# Patient Record
Sex: FEMALE | Race: BLACK OR AFRICAN AMERICAN | Employment: UNEMPLOYED | URBAN - METROPOLITAN AREA
[De-identification: names, ages, dates, MRNs, and addresses within clinical notes are randomized per-mention and may not be internally consistent; named-entity substitution may affect disease eponyms.]

---

## 2018-01-03 RX ORDER — PRAMIPEXOLE DIHYDROCHLORIDE 0.12 MG/1
0.12 TABLET ORAL
COMMUNITY

## 2018-01-03 RX ORDER — DEXTROAMPHETAMINE SULFATE 10 MG/1
20 TABLET ORAL 2 TIMES DAILY
COMMUNITY

## 2018-01-03 RX ORDER — ALBUTEROL SULFATE 90 UG/1
AEROSOL, METERED RESPIRATORY (INHALATION)
COMMUNITY

## 2018-01-03 RX ORDER — CLONAZEPAM 1 MG/1
1 TABLET ORAL 2 TIMES DAILY
COMMUNITY

## 2018-01-03 RX ORDER — FLUTICASONE PROPIONATE 110 UG/1
2 AEROSOL, METERED RESPIRATORY (INHALATION) EVERY 12 HOURS
COMMUNITY

## 2018-01-03 RX ORDER — DIMETHYL FUMARATE 240 MG/1
CAPSULE ORAL 2 TIMES DAILY
COMMUNITY

## 2018-01-03 RX ORDER — SENNOSIDES 8.6 MG/1
2 TABLET ORAL DAILY
COMMUNITY

## 2018-01-03 RX ORDER — POLYETHYLENE GLYCOL 3350 17 G/17G
17 POWDER, FOR SOLUTION ORAL 2 TIMES DAILY
COMMUNITY

## 2018-01-03 RX ORDER — BUPROPION HYDROCHLORIDE 150 MG/1
150 TABLET, EXTENDED RELEASE ORAL 2 TIMES DAILY
COMMUNITY

## 2018-01-03 RX ORDER — ESZOPICLONE 3 MG/1
3 TABLET, FILM COATED ORAL
COMMUNITY

## 2018-01-03 RX ORDER — ATORVASTATIN CALCIUM 80 MG/1
80 TABLET, FILM COATED ORAL
COMMUNITY

## 2018-01-03 RX ORDER — DULOXETIN HYDROCHLORIDE 60 MG/1
60 CAPSULE, DELAYED RELEASE ORAL 2 TIMES DAILY
COMMUNITY

## 2018-01-03 RX ORDER — LANSOPRAZOLE 30 MG/1
60 CAPSULE, DELAYED RELEASE ORAL
COMMUNITY

## 2018-01-03 RX ORDER — METOPROLOL SUCCINATE 25 MG/1
25 TABLET, EXTENDED RELEASE ORAL DAILY
COMMUNITY

## 2018-01-03 RX ORDER — GABAPENTIN 300 MG/1
600 CAPSULE ORAL 3 TIMES DAILY
COMMUNITY

## 2018-01-03 RX ORDER — METFORMIN HYDROCHLORIDE 500 MG/1
500 TABLET ORAL 2 TIMES DAILY WITH MEALS
COMMUNITY

## 2018-01-03 RX ORDER — ARIPIPRAZOLE 30 MG/1
30 TABLET ORAL DAILY
COMMUNITY

## 2018-01-03 RX ORDER — TRAMADOL HYDROCHLORIDE 50 MG/1
100 TABLET ORAL
COMMUNITY

## 2018-01-03 RX ORDER — LYSINE HCL 500 MG
TABLET ORAL 2 TIMES DAILY
COMMUNITY

## 2018-01-03 RX ORDER — LISINOPRIL 10 MG/1
10 TABLET ORAL DAILY
COMMUNITY

## 2018-01-03 RX ORDER — NIFEDIPINE 30 MG/1
TABLET, EXTENDED RELEASE ORAL DAILY
COMMUNITY

## 2018-01-04 ENCOUNTER — ANESTHESIA EVENT (OUTPATIENT)
Dept: SURGERY | Age: 53
DRG: 493 | End: 2018-01-04
Payer: MEDICARE

## 2018-01-05 ENCOUNTER — HOSPITAL ENCOUNTER (INPATIENT)
Age: 53
LOS: 2 days | Discharge: HOME | DRG: 493 | End: 2018-01-07
Attending: ORTHOPAEDIC SURGERY | Admitting: ORTHOPAEDIC SURGERY
Payer: MEDICARE

## 2018-01-05 ENCOUNTER — APPOINTMENT (OUTPATIENT)
Dept: GENERAL RADIOLOGY | Age: 53
DRG: 493 | End: 2018-01-05
Attending: ORTHOPAEDIC SURGERY
Payer: MEDICARE

## 2018-01-05 ENCOUNTER — ANESTHESIA (OUTPATIENT)
Dept: SURGERY | Age: 53
DRG: 493 | End: 2018-01-05
Payer: MEDICARE

## 2018-01-05 DIAGNOSIS — S42.351G CLOSED DISPLACED COMMINUTED FRACTURE OF SHAFT OF RIGHT HUMERUS WITH DELAYED HEALING, SUBSEQUENT ENCOUNTER: Primary | ICD-10-CM

## 2018-01-05 PROBLEM — S42.301G: Status: ACTIVE | Noted: 2018-01-05

## 2018-01-05 LAB
GLUCOSE BLD STRIP.AUTO-MCNC: 123 MG/DL (ref 70–110)
GLUCOSE BLD STRIP.AUTO-MCNC: 74 MG/DL (ref 70–110)

## 2018-01-05 PROCEDURE — C1713 ANCHOR/SCREW BN/BN,TIS/BN: HCPCS | Performed by: ORTHOPAEDIC SURGERY

## 2018-01-05 PROCEDURE — 0PUG0KZ SUPPLEMENT LEFT HUMERAL SHAFT WITH NONAUTOLOGOUS TISSUE SUBSTITUTE, OPEN APPROACH: ICD-10-PCS | Performed by: ORTHOPAEDIC SURGERY

## 2018-01-05 PROCEDURE — 77030020753 HC CUF TRNQT 1BLA STRY -B: Performed by: ORTHOPAEDIC SURGERY

## 2018-01-05 PROCEDURE — 0PPF04Z REMOVAL OF INTERNAL FIXATION DEVICE FROM RIGHT HUMERAL SHAFT, OPEN APPROACH: ICD-10-PCS | Performed by: ORTHOPAEDIC SURGERY

## 2018-01-05 PROCEDURE — 74011250637 HC RX REV CODE- 250/637: Performed by: NURSE ANESTHETIST, CERTIFIED REGISTERED

## 2018-01-05 PROCEDURE — 74011250637 HC RX REV CODE- 250/637: Performed by: ORTHOPAEDIC SURGERY

## 2018-01-05 PROCEDURE — 77030017009 HC DRN WNDE BARD -E: Performed by: ORTHOPAEDIC SURGERY

## 2018-01-05 PROCEDURE — 77030016470 HC BIT DRL QC1 SYNT -C: Performed by: ORTHOPAEDIC SURGERY

## 2018-01-05 PROCEDURE — 0PSG04Z REPOSITION LEFT HUMERAL SHAFT WITH INTERNAL FIXATION DEVICE, OPEN APPROACH: ICD-10-PCS | Performed by: ORTHOPAEDIC SURGERY

## 2018-01-05 PROCEDURE — 65270000029 HC RM PRIVATE

## 2018-01-05 PROCEDURE — 77030011265 HC ELECTRD BLD HEX COVD -A: Performed by: ORTHOPAEDIC SURGERY

## 2018-01-05 PROCEDURE — 77030008462 HC STPLR SKN PROX J&J -A: Performed by: ORTHOPAEDIC SURGERY

## 2018-01-05 PROCEDURE — 74011250636 HC RX REV CODE- 250/636: Performed by: NURSE ANESTHETIST, CERTIFIED REGISTERED

## 2018-01-05 PROCEDURE — 76210000017 HC OR PH I REC 1.5 TO 2 HR: Performed by: ORTHOPAEDIC SURGERY

## 2018-01-05 PROCEDURE — 77030002933 HC SUT MCRYL J&J -A: Performed by: ORTHOPAEDIC SURGERY

## 2018-01-05 PROCEDURE — 64450 NJX AA&/STRD OTHER PN/BRANCH: CPT | Performed by: ANESTHESIOLOGY

## 2018-01-05 PROCEDURE — 82962 GLUCOSE BLOOD TEST: CPT

## 2018-01-05 PROCEDURE — 77030013079 HC BLNKT BAIR HGGR 3M -A: Performed by: NURSE ANESTHETIST, CERTIFIED REGISTERED

## 2018-01-05 PROCEDURE — 77030012904 HC TAPE CST BSNM -A: Performed by: ORTHOPAEDIC SURGERY

## 2018-01-05 PROCEDURE — 77030003862 HC BIT DRL SYNT -B: Performed by: ORTHOPAEDIC SURGERY

## 2018-01-05 PROCEDURE — 74011250636 HC RX REV CODE- 250/636: Performed by: ORTHOPAEDIC SURGERY

## 2018-01-05 PROCEDURE — 74011250636 HC RX REV CODE- 250/636

## 2018-01-05 PROCEDURE — 77030016665 HC BUR RND2 STRY -B: Performed by: ORTHOPAEDIC SURGERY

## 2018-01-05 PROCEDURE — 01X60Z6 TRANSFER RADIAL NERVE TO RADIAL NERVE, OPEN APPROACH: ICD-10-PCS | Performed by: ORTHOPAEDIC SURGERY

## 2018-01-05 PROCEDURE — 77030034094 HC GRFT BN SUB ELITE TRNTY MUSC -I1: Performed by: ORTHOPAEDIC SURGERY

## 2018-01-05 PROCEDURE — 77030028990 HC ADH TISS DERMFLX CHMP -B: Performed by: ORTHOPAEDIC SURGERY

## 2018-01-05 PROCEDURE — 77030012422 HC DRN WND COVD -A: Performed by: ORTHOPAEDIC SURGERY

## 2018-01-05 PROCEDURE — 77030003028 HC SUT VCRL J&J -A: Performed by: ORTHOPAEDIC SURGERY

## 2018-01-05 PROCEDURE — 77030003029 HC SUT VCRL J&J -B: Performed by: ORTHOPAEDIC SURGERY

## 2018-01-05 PROCEDURE — 76010000133 HC OR TIME 3 TO 3.5 HR: Performed by: ORTHOPAEDIC SURGERY

## 2018-01-05 PROCEDURE — 77030018836 HC SOL IRR NACL ICUM -A: Performed by: ORTHOPAEDIC SURGERY

## 2018-01-05 PROCEDURE — 76060000037 HC ANESTHESIA 3 TO 3.5 HR: Performed by: ORTHOPAEDIC SURGERY

## 2018-01-05 PROCEDURE — 77030012407 HC DRN WND BARD -B: Performed by: ORTHOPAEDIC SURGERY

## 2018-01-05 PROCEDURE — 77030020335 HC PDNG CST COVD -A: Performed by: ORTHOPAEDIC SURGERY

## 2018-01-05 PROCEDURE — 77030031139 HC SUT VCRL2 J&J -A: Performed by: ORTHOPAEDIC SURGERY

## 2018-01-05 PROCEDURE — 77030012510 HC MSK AIRWY LMA TELE -B: Performed by: NURSE ANESTHETIST, CERTIFIED REGISTERED

## 2018-01-05 PROCEDURE — 77030008974 HC BN CANC CHP LIFV -D: Performed by: ORTHOPAEDIC SURGERY

## 2018-01-05 PROCEDURE — 74011250636 HC RX REV CODE- 250/636: Performed by: ANESTHESIOLOGY

## 2018-01-05 PROCEDURE — 76942 ECHO GUIDE FOR BIOPSY: CPT | Performed by: ANESTHESIOLOGY

## 2018-01-05 PROCEDURE — 77030032490 HC SLV COMPR SCD KNE COVD -B: Performed by: ORTHOPAEDIC SURGERY

## 2018-01-05 PROCEDURE — 74011000250 HC RX REV CODE- 250

## 2018-01-05 DEVICE — GRAFT BNE SUB M CANC FRZN MORSELIZED W/ VIABLE CELL TRINITY: Type: IMPLANTABLE DEVICE | Site: ARM | Status: FUNCTIONAL

## 2018-01-05 DEVICE — SCREW BNE L28MM DIA3.5MM CORT S STL ST LOK FULL THRD: Type: IMPLANTABLE DEVICE | Site: ARM | Status: FUNCTIONAL

## 2018-01-05 DEVICE — 3.5MM CORTEX SCREW SELF-TAPPING 24MM: Type: IMPLANTABLE DEVICE | Site: ARM | Status: FUNCTIONAL

## 2018-01-05 DEVICE — SCREW BNE L26MM DIA3.5MM CORT S STL ST LOK FULL THRD: Type: IMPLANTABLE DEVICE | Site: ARM | Status: FUNCTIONAL

## 2018-01-05 DEVICE — SCREW BNE L36MM DIA4.5MM PROX CORT TIB S STL ST LOK FULL: Type: IMPLANTABLE DEVICE | Site: ARM | Status: FUNCTIONAL

## 2018-01-05 DEVICE — SCREW BNE L26MM DIA3.5MM CORT S STL ST NONCANNULATED LOK: Type: IMPLANTABLE DEVICE | Site: ARM | Status: FUNCTIONAL

## 2018-01-05 DEVICE — PLATE BNE W13.5XL134MM THK4.2MM 7 H BILAT S STL NAR LOK: Type: IMPLANTABLE DEVICE | Site: ARM | Status: FUNCTIONAL

## 2018-01-05 DEVICE — SCREW BNE L18MM DIA3.5MM CORT S STL ST LOK FULL THRD: Type: IMPLANTABLE DEVICE | Site: ARM | Status: FUNCTIONAL

## 2018-01-05 DEVICE — PLATE BNE L194MM 8 H L DST EXTRAARTICULAR HUM S STL LOK: Type: IMPLANTABLE DEVICE | Site: ARM | Status: FUNCTIONAL

## 2018-01-05 DEVICE — SCREW BNE L28MM DIA5MM S STL ST LOK FULL THRD T25 STARDRV: Type: IMPLANTABLE DEVICE | Site: ARM | Status: FUNCTIONAL

## 2018-01-05 DEVICE — BONE CHIP CANC CRSH 1-8MM 20ML -- PCAN1/4: Type: IMPLANTABLE DEVICE | Site: ARM | Status: FUNCTIONAL

## 2018-01-05 DEVICE — SCREW BNE L26MM DIA4.5MM PROX CORT TIB S STL ST LOK FULL: Type: IMPLANTABLE DEVICE | Site: ARM | Status: FUNCTIONAL

## 2018-01-05 DEVICE — SCREW BNE L26MM DIA5MM S STL ST LOK FULL THRD T25 STARDRV: Type: IMPLANTABLE DEVICE | Site: ARM | Status: FUNCTIONAL

## 2018-01-05 RX ORDER — MIDAZOLAM HYDROCHLORIDE 1 MG/ML
2 INJECTION, SOLUTION INTRAMUSCULAR; INTRAVENOUS ONCE
Status: COMPLETED | OUTPATIENT
Start: 2018-01-05 | End: 2018-01-05

## 2018-01-05 RX ORDER — CEFAZOLIN SODIUM 2 G/50ML
2 SOLUTION INTRAVENOUS EVERY 8 HOURS
Status: DISCONTINUED | OUTPATIENT
Start: 2018-01-05 | End: 2018-01-07 | Stop reason: HOSPADM

## 2018-01-05 RX ORDER — SODIUM CHLORIDE 0.9 % (FLUSH) 0.9 %
5-10 SYRINGE (ML) INJECTION AS NEEDED
Status: DISCONTINUED | OUTPATIENT
Start: 2018-01-05 | End: 2018-01-07 | Stop reason: HOSPADM

## 2018-01-05 RX ORDER — SODIUM CHLORIDE 9 MG/ML
INJECTION, SOLUTION INTRAVENOUS
Status: DISPENSED
Start: 2018-01-05 | End: 2018-01-06

## 2018-01-05 RX ORDER — MAGNESIUM SULFATE 100 %
4 CRYSTALS MISCELLANEOUS AS NEEDED
Status: DISCONTINUED | OUTPATIENT
Start: 2018-01-05 | End: 2018-01-05 | Stop reason: HOSPADM

## 2018-01-05 RX ORDER — HYDROMORPHONE HYDROCHLORIDE 2 MG/ML
0.5 INJECTION, SOLUTION INTRAMUSCULAR; INTRAVENOUS; SUBCUTANEOUS
Status: DISCONTINUED | OUTPATIENT
Start: 2018-01-05 | End: 2018-01-05 | Stop reason: HOSPADM

## 2018-01-05 RX ORDER — SODIUM CHLORIDE, SODIUM LACTATE, POTASSIUM CHLORIDE, CALCIUM CHLORIDE 600; 310; 30; 20 MG/100ML; MG/100ML; MG/100ML; MG/100ML
75 INJECTION, SOLUTION INTRAVENOUS CONTINUOUS
Status: DISCONTINUED | OUTPATIENT
Start: 2018-01-05 | End: 2018-01-05 | Stop reason: HOSPADM

## 2018-01-05 RX ORDER — FENTANYL CITRATE 50 UG/ML
100 INJECTION, SOLUTION INTRAMUSCULAR; INTRAVENOUS ONCE
Status: COMPLETED | OUTPATIENT
Start: 2018-01-05 | End: 2018-01-05

## 2018-01-05 RX ORDER — ACETAMINOPHEN 10 MG/ML
1000 INJECTION, SOLUTION INTRAVENOUS ONCE
Status: COMPLETED | OUTPATIENT
Start: 2018-01-05 | End: 2018-01-05

## 2018-01-05 RX ORDER — HYDROMORPHONE HYDROCHLORIDE 2 MG/ML
0.2 INJECTION, SOLUTION INTRAMUSCULAR; INTRAVENOUS; SUBCUTANEOUS AS NEEDED
Status: DISCONTINUED | OUTPATIENT
Start: 2018-01-05 | End: 2018-01-05 | Stop reason: HOSPADM

## 2018-01-05 RX ORDER — SODIUM CHLORIDE 0.9 % (FLUSH) 0.9 %
5-10 SYRINGE (ML) INJECTION EVERY 8 HOURS
Status: DISCONTINUED | OUTPATIENT
Start: 2018-01-05 | End: 2018-01-07 | Stop reason: HOSPADM

## 2018-01-05 RX ORDER — INSULIN LISPRO 100 [IU]/ML
INJECTION, SOLUTION INTRAVENOUS; SUBCUTANEOUS ONCE
Status: DISCONTINUED | OUTPATIENT
Start: 2018-01-05 | End: 2018-01-05 | Stop reason: HOSPADM

## 2018-01-05 RX ORDER — SODIUM CHLORIDE 0.9 % (FLUSH) 0.9 %
5-10 SYRINGE (ML) INJECTION AS NEEDED
Status: DISCONTINUED | OUTPATIENT
Start: 2018-01-05 | End: 2018-01-05 | Stop reason: HOSPADM

## 2018-01-05 RX ORDER — SODIUM CHLORIDE, SODIUM LACTATE, POTASSIUM CHLORIDE, CALCIUM CHLORIDE 600; 310; 30; 20 MG/100ML; MG/100ML; MG/100ML; MG/100ML
100 INJECTION, SOLUTION INTRAVENOUS CONTINUOUS
Status: DISCONTINUED | OUTPATIENT
Start: 2018-01-05 | End: 2018-01-05 | Stop reason: HOSPADM

## 2018-01-05 RX ORDER — BUPIVACAINE HYDROCHLORIDE AND EPINEPHRINE 2.5; 5 MG/ML; UG/ML
50 INJECTION, SOLUTION EPIDURAL; INFILTRATION; INTRACAUDAL; PERINEURAL ONCE
Status: DISCONTINUED | OUTPATIENT
Start: 2018-01-05 | End: 2018-01-05 | Stop reason: CLARIF

## 2018-01-05 RX ORDER — FENTANYL CITRATE 50 UG/ML
INJECTION, SOLUTION INTRAMUSCULAR; INTRAVENOUS AS NEEDED
Status: DISCONTINUED | OUTPATIENT
Start: 2018-01-05 | End: 2018-01-05 | Stop reason: HOSPADM

## 2018-01-05 RX ORDER — DOCUSATE SODIUM 100 MG/1
100 CAPSULE, LIQUID FILLED ORAL 2 TIMES DAILY
Status: DISCONTINUED | OUTPATIENT
Start: 2018-01-05 | End: 2018-01-07 | Stop reason: HOSPADM

## 2018-01-05 RX ORDER — OXYCODONE AND ACETAMINOPHEN 5; 325 MG/1; MG/1
1-2 TABLET ORAL
Qty: 50 TAB | Refills: 0 | Status: SHIPPED | OUTPATIENT
Start: 2018-01-05

## 2018-01-05 RX ORDER — ONDANSETRON 4 MG/1
4 TABLET, ORALLY DISINTEGRATING ORAL
Status: DISCONTINUED | OUTPATIENT
Start: 2018-01-05 | End: 2018-01-07 | Stop reason: HOSPADM

## 2018-01-05 RX ORDER — CEFAZOLIN SODIUM 2 G/50ML
2 SOLUTION INTRAVENOUS ONCE
Status: COMPLETED | OUTPATIENT
Start: 2018-01-05 | End: 2018-01-05

## 2018-01-05 RX ORDER — PROPOFOL 10 MG/ML
INJECTION, EMULSION INTRAVENOUS AS NEEDED
Status: DISCONTINUED | OUTPATIENT
Start: 2018-01-05 | End: 2018-01-05 | Stop reason: HOSPADM

## 2018-01-05 RX ORDER — DEXTROSE 50 % IN WATER (D50W) INTRAVENOUS SYRINGE
25-50 AS NEEDED
Status: DISCONTINUED | OUTPATIENT
Start: 2018-01-05 | End: 2018-01-05 | Stop reason: HOSPADM

## 2018-01-05 RX ORDER — LIDOCAINE HYDROCHLORIDE 10 MG/ML
0.1 INJECTION, SOLUTION EPIDURAL; INFILTRATION; INTRACAUDAL; PERINEURAL AS NEEDED
Status: DISCONTINUED | OUTPATIENT
Start: 2018-01-05 | End: 2018-01-05 | Stop reason: HOSPADM

## 2018-01-05 RX ORDER — MIDAZOLAM HYDROCHLORIDE 1 MG/ML
INJECTION, SOLUTION INTRAMUSCULAR; INTRAVENOUS AS NEEDED
Status: DISCONTINUED | OUTPATIENT
Start: 2018-01-05 | End: 2018-01-05 | Stop reason: HOSPADM

## 2018-01-05 RX ORDER — LIDOCAINE HYDROCHLORIDE 20 MG/ML
INJECTION, SOLUTION EPIDURAL; INFILTRATION; INTRACAUDAL; PERINEURAL AS NEEDED
Status: DISCONTINUED | OUTPATIENT
Start: 2018-01-05 | End: 2018-01-05 | Stop reason: HOSPADM

## 2018-01-05 RX ORDER — BUPIVACAINE HYDROCHLORIDE AND EPINEPHRINE 2.5; 5 MG/ML; UG/ML
50 INJECTION, SOLUTION EPIDURAL; INFILTRATION; INTRACAUDAL; PERINEURAL ONCE
Status: DISCONTINUED | OUTPATIENT
Start: 2018-01-05 | End: 2018-01-05

## 2018-01-05 RX ORDER — DIPHENHYDRAMINE HYDROCHLORIDE 50 MG/ML
12.5 INJECTION, SOLUTION INTRAMUSCULAR; INTRAVENOUS
Status: DISCONTINUED | OUTPATIENT
Start: 2018-01-05 | End: 2018-01-05 | Stop reason: HOSPADM

## 2018-01-05 RX ORDER — OXYCODONE AND ACETAMINOPHEN 5; 325 MG/1; MG/1
1 TABLET ORAL
Status: COMPLETED | OUTPATIENT
Start: 2018-01-05 | End: 2018-01-05

## 2018-01-05 RX ORDER — OXYCODONE AND ACETAMINOPHEN 5; 325 MG/1; MG/1
2 TABLET ORAL
Status: DISCONTINUED | OUTPATIENT
Start: 2018-01-05 | End: 2018-01-07 | Stop reason: HOSPADM

## 2018-01-05 RX ORDER — ONDANSETRON 2 MG/ML
4 INJECTION INTRAMUSCULAR; INTRAVENOUS ONCE
Status: DISCONTINUED | OUTPATIENT
Start: 2018-01-05 | End: 2018-01-05 | Stop reason: HOSPADM

## 2018-01-05 RX ORDER — MORPHINE SULFATE 2 MG/ML
2 INJECTION, SOLUTION INTRAMUSCULAR; INTRAVENOUS
Status: DISCONTINUED | OUTPATIENT
Start: 2018-01-05 | End: 2018-01-07 | Stop reason: HOSPADM

## 2018-01-05 RX ORDER — ONDANSETRON 2 MG/ML
INJECTION INTRAMUSCULAR; INTRAVENOUS AS NEEDED
Status: DISCONTINUED | OUTPATIENT
Start: 2018-01-05 | End: 2018-01-05 | Stop reason: HOSPADM

## 2018-01-05 RX ADMIN — SODIUM CHLORIDE, SODIUM LACTATE, POTASSIUM CHLORIDE, AND CALCIUM CHLORIDE 75 ML/HR: 600; 310; 30; 20 INJECTION, SOLUTION INTRAVENOUS at 10:34

## 2018-01-05 RX ADMIN — CEFAZOLIN SODIUM 2 G: 2 SOLUTION INTRAVENOUS at 20:41

## 2018-01-05 RX ADMIN — ONDANSETRON 4 MG: 2 INJECTION INTRAMUSCULAR; INTRAVENOUS at 12:00

## 2018-01-05 RX ADMIN — OXYCODONE HYDROCHLORIDE AND ACETAMINOPHEN 2 TABLET: 5; 325 TABLET ORAL at 20:41

## 2018-01-05 RX ADMIN — HYDROMORPHONE HYDROCHLORIDE 0.5 MG: 2 INJECTION INTRAMUSCULAR; INTRAVENOUS; SUBCUTANEOUS at 15:15

## 2018-01-05 RX ADMIN — SODIUM CHLORIDE, SODIUM LACTATE, POTASSIUM CHLORIDE, AND CALCIUM CHLORIDE: 600; 310; 30; 20 INJECTION, SOLUTION INTRAVENOUS at 14:25

## 2018-01-05 RX ADMIN — MIDAZOLAM HYDROCHLORIDE 2 MG: 1 INJECTION, SOLUTION INTRAMUSCULAR; INTRAVENOUS at 11:50

## 2018-01-05 RX ADMIN — FENTANYL CITRATE 100 MCG: 50 INJECTION, SOLUTION INTRAMUSCULAR; INTRAVENOUS at 10:56

## 2018-01-05 RX ADMIN — OXYCODONE HYDROCHLORIDE AND ACETAMINOPHEN 1 TABLET: 5; 325 TABLET ORAL at 15:54

## 2018-01-05 RX ADMIN — HYDROMORPHONE HYDROCHLORIDE 0.5 MG: 2 INJECTION INTRAMUSCULAR; INTRAVENOUS; SUBCUTANEOUS at 15:25

## 2018-01-05 RX ADMIN — FENTANYL CITRATE 25 MCG: 50 INJECTION, SOLUTION INTRAMUSCULAR; INTRAVENOUS at 14:35

## 2018-01-05 RX ADMIN — FENTANYL CITRATE 25 MCG: 50 INJECTION, SOLUTION INTRAMUSCULAR; INTRAVENOUS at 14:25

## 2018-01-05 RX ADMIN — CEFAZOLIN SODIUM 2 G: 2 SOLUTION INTRAVENOUS at 12:00

## 2018-01-05 RX ADMIN — DOCUSATE SODIUM 100 MG: 100 CAPSULE, LIQUID FILLED ORAL at 18:41

## 2018-01-05 RX ADMIN — ACETAMINOPHEN 1000 MG: 10 INJECTION, SOLUTION INTRAVENOUS at 16:33

## 2018-01-05 RX ADMIN — PROPOFOL 170 MG: 10 INJECTION, EMULSION INTRAVENOUS at 11:55

## 2018-01-05 RX ADMIN — FENTANYL CITRATE 50 MCG: 50 INJECTION, SOLUTION INTRAMUSCULAR; INTRAVENOUS at 13:59

## 2018-01-05 RX ADMIN — MIDAZOLAM HYDROCHLORIDE 2 MG: 1 INJECTION, SOLUTION INTRAMUSCULAR; INTRAVENOUS at 10:56

## 2018-01-05 RX ADMIN — LIDOCAINE HYDROCHLORIDE 40 MG: 20 INJECTION, SOLUTION EPIDURAL; INFILTRATION; INTRACAUDAL; PERINEURAL at 11:55

## 2018-01-05 RX ADMIN — Medication 10 ML: at 22:10

## 2018-01-05 NOTE — BRIEF OP NOTE
BRIEF OPERATIVE NOTE    Date of Procedure: 1/5/2018   Preoperative Diagnosis: s42.302s left humerous fx, t84.84xa painful hardware  Postoperative Diagnosis: s42.302s left humerous fx, t84.84xa painful hardware    Procedure(s):  left HUMERUS removal of hardware, revision OPEN REDUCTION INTERNAL FIXATION left humerus w/bone grafting internal bone stimulator  Surgeon(s) and Role:     * Waqar Campbell MD - Primary         Assistant Staff:       Surgical Staff:  Circ-1: Patience Toledo RN  Circ-Relief: Smith Jacobsen RN  Scrub Tech-1: Cheo Pat  Scrub Tech-Relief: Verlene Phelps  Surg Asst-1: Windom Plum  No case tracking events are documented in the log. Anesthesia: Regional   Estimated Blood Loss: 300 cc  Specimens: * No specimens in log *   Findings: see op note   Complications: none  Implants:   Implant Name Type Inv.  Item Serial No.  Lot No. LRB No. Used Action   GRAFT BNE [employer] --  - [id number]  GRAFT [employer]NE [employer] --  [id number] MUSCULOSKELETAL TRANS 0210 Left 1 Implanted   SCR BNE CRTX ST HEX 3.5X24MM -- SS - [id number]  SCR BNE CRTX ST HEX 3.5X24MM -- SS  [employer] [place] 1111 Left 2 Implanted   SCR BNE CRTX ST HEX 3.5X26MM -- SS - [id number]  SCR BNE CRTX ST HEX 3.5X26MM -- SS  [employer] [place] 1[place] Left 3 Implanted   SCR BNE LCK ST T25 3.5X18MM SS --  - [id number]  SCR BNE LCK ST T25 3.5X18MM SS --   [employer] [place] 1[place] Left 1 Implanted   SCR BNE LCK ST T25 3.5X26MM SS --  - [id number]  SCR BNE LCK ST T25 3.5X26MM SS --   [employer] [place] 1[place] Left 2 Implanted   SCR BNE LCK ST T25 3.5X28MM SS --  - [id number]  SCR BNE LCK ST T25 3.5X28MM SS --   [employer] [place] 1[place] Left 5 Implanted   PLATE HUM DSTL 8H/LT 3.0M735JK -- EXTRA-ARTICLR LCP - [id number]  PLATE HUM DSTL 8H/LT 3.3N853GR -- EXTRA-ARTICLR LCP  [employer] [place] Left 1 Implanted   BONE CHIP CANC [address] 1-8MM 20ML -- PCAN1/4 - [id number]  BONE CHIP CANC [address] 1-8MM 20ML -- PCAN1/4 [id number] [facility] TISSUE [facility] Left 1 Implanted   SCR BNE CRTX ST 4.5X36MM SS --  - [id number]  SCR BNE CRTX ST 4.5X36MM SS --   [employer] [place] 1[place] Left 1 Implanted   SCR BNE CRTX ST 4.5X26MM SS --  - [id number]  SCR BNE CRTX ST 4.5X26MM SS --   [employer] [place] 1[place] Left 1 Implanted   SCR BNE LCK ST T25 3.5X26MM SS --  - [id number]  SCR BNE LCK ST T25 3.5X26MM SS --   [employer] [place] 1[place] Left 1 Implanted   SCR BNE LCK ST T25 3.5X26MM SS --  - [id number]  SCR BNE LCK ST T25 3.5X26MM SS --   SY[place] [place] [address] Left 1 Implanted   SCR BNE LCK ST T25 3.5X28MM SS --  - [id number]  SCR BNE LCK ST T25 3.5X28MM SS --   [employer] [place] 1[place] Left 1 Implanted   PLATE BNE JOSELIN LCP 7H 4.6N969QU --  - [id number]   PLATE BNE JOSELIN LCP 7H 4.5O426NT --    [employer] [place] Left 1 Implanted       Home in 1-2 days  Sling  Drain out in 24 hours

## 2018-01-05 NOTE — ANESTHESIA POSTPROCEDURE EVALUATION
Post-Anesthesia Evaluation & Assessment    Visit Vitals    /53    Pulse 80    Temp 36.4 °C (97.6 °F)    Resp 21    Ht 5' 7\" (1.702 m)    Wt 105.2 kg (232 lb)    SpO2 96%    BMI 36.34 kg/m2       Nausea/Vomiting: no nausea and no vomiting    Pain score (VAS): 0    Post-operative hydration adequate.     Mental status & Level of consciousness: orientation per pre-anesthetic level    Neurological status: moves all extremities, sensation grossly intact    Pulmonary status: airway patent, no supplemental oxygen required    Complications related to anesthesia: none    Additional comments:        Queen Hazel CRNA  January 5, 2018

## 2018-01-05 NOTE — H&P
Admission History and Physical    Caitlin Cardenas is a 45 y.o. female who is here for surgery for revision ORIF of left humerus            HPI:  See above  Past Medical History:   Diagnosis Date    Arthritis     Asthma     Diabetes ([facility] [address]     GERD (gastroesophageal reflux disease)     Hiatal hernia     Hypercholesteremia     Hypertension     MS (multiple sclerosis) ([facility] [address] [facility]     Psychiatric disorder     DEPRESSION, ANXIETY, PANIC    PUD (peptic ulcer disease)     Sciatica     BOTH HIPS    Seizures (HCC) 1980'S    Sickle cell trait ([facility] [address]      Past Surgical History:   Procedure Laterality Date    HX ORTHOPAEDIC  12/2017    ARM SX    HX PARTIAL HYSTERECTOMY       Social History     Social History    Marital status:      Spouse name: N/A    Number of children: N/A    Years of education: N/A     Occupational History    Not on file. Social History Main Topics    Smoking status: Never Smoker    Smokeless tobacco: Never Used    Alcohol use No    Drug use: No    Sexual activity: Not on file     Other Topics Concern    Not on file     Social History Narrative       History reviewed. No pertinent family history. Allergies   Allergen Reactions    Doxycycline Other (comments)    Gyne-Lotrimin [Clotrimazole] Hives     Hives in vagina     Monistat 1 (Tioconazole) [Tioconazole] Hives     Hives in vagina     Sulfa (Sulfonamide Antibiotics) Other (comments)       Home Medications:     Prior to Admission Medications   Prescriptions Last Dose Informant Patient Reported? Taking? ARIPiprazole (ABILIFY) 30 mg tablet 1/5/2018 at Unknown time  Yes Yes   Sig: Take 30 mg by mouth daily. BUTALB/ACETAMINOPHEN/CAFFEINE (FIORICET PO) Unknown at Unknown time  Yes No   Sig: Take  by mouth. Calcium Carbonate-Vit D3-Min 600 mg calcium- 400 unit tab 1/5/2018 at Unknown time  Yes Yes   Sig: Take  by mouth two (2) times a day.    Cetirizine (ZYRTEC) 10 mg cap 1/5/2018 at Unknown time  Yes Yes   Sig: Take by mouth two (2) times a day. DULoxetine (CYMBALTA) 60 mg capsule 2018 at Unknown time  Yes Yes   Sig: Take 60 mg by mouth two (2) times a day. NIFEdipine ER (PROCARDIA XL) 30 mg ER tablet 2018 at Unknown time  Yes Yes   Sig: Take  by mouth daily. OMEGA-3S/DHA/EPA/FISH OIL (OMEGA 3 PO) Unknown at Unknown time  Yes No   Sig: Take  by mouth daily. PRENATAL VIT CALC,IRON,FOLIC (PRENATAL VITAMIN PO) 2018 at Unknown time  Yes Yes   Sig: Take  by mouth daily. albuterol (PROVENTIL HFA) 90 mcg/actuation inhaler Unknown at Unknown time  Yes No   Sig: Take  by inhalation every four (4) hours as needed for Wheezing. atorvastatin (LIPITOR) 80 mg tablet 2018 at Unknown time  Yes Yes   Sig: Take 80 mg by mouth nightly. buPROPion SR (WELLBUTRIN SR) 150 mg SR tablet 2018 at Unknown time  Yes Yes   Sig: Take 150 mg by mouth two (2) times a day. clonazePAM (KLONOPIN) 1 mg tablet 2018 at Unknown time  Yes Yes   Sig: Take 1 mg by mouth two (2) times a day. dextroamphetamine (DEXEDRINE) 10 mg tablet 2018 at Unknown time  Yes Yes   Sig: Take 20 mg by mouth two (2) times a day. dimethyl fumarate (TECFIDERA) 240 mg cpDR 2018 at Unknown time  Yes Yes   Sig: Take  by mouth two (2) times a day. eszopiclone (LUNESTA) 3 mg tablet 2018 at Unknown time  Yes Yes   Sig: Take 3 mg by mouth nightly. exenatide microspheres (BYDUREON) 2 mg/0.65 mL pnij 1/3/2018  Yes Yes   Si mg by SubCUTAneous route every seven (7) days. fluticasone (FLOVENT HFA) 110 mcg/actuation inhaler Unknown at Unknown time  Yes No   Sig: Take 2 Puffs by inhalation every twelve (12) hours. gabapentin (NEURONTIN) 300 mg capsule 2018 at Unknown time  Yes Yes   Sig: Take 600 mg by mouth three (3) times daily. lansoprazole (PREVACID) 30 mg capsule 2018 at Unknown time  Yes Yes   Sig: Take 60 mg by mouth Daily (before breakfast).    lisinopril (PRINIVIL, ZESTRIL) 10 mg tablet 2018 at Unknown time  Yes Yes Sig: Take 10 mg by mouth daily. metFORMIN (GLUCOPHAGE) 500 mg tablet 1/5/2018 at Unknown time  Yes Yes   Sig: Take 500 mg by mouth two (2) times daily (with meals). metoprolol succinate (TOPROL-XL) 25 mg XL tablet 1/5/2018 at Unknown time  Yes Yes   Sig: Take 25 mg by mouth daily. polyethylene glycol (MIRALAX) 17 gram packet 1/3/2018  Yes Yes   Sig: Take 17 g by mouth two (2) times a day. pramipexole (MIRAPEX) 0.125 mg tablet 1/4/2018 at Unknown time  Yes Yes   Sig: Take 0.125 mg by mouth nightly. senna (SENNA) 8.6 mg tablet 1/5/2018 at Unknown time  Yes Yes   Sig: Take 2 Tabs by mouth daily. traMADol (ULTRAM) 50 mg tablet 12/31/2017  Yes Yes   Sig: Take 100 mg by mouth every six (6) hours as needed for Pain. Facility-Administered Medications: None            Physical Assessment:     Visit Vitals    /81 (BP 1 Location: Right arm, BP Patient Position: At rest)    Pulse 84    Temp 97.4 °F (36.3 °C)    Resp 16    Ht 5' 7\" (1.702 m)    Wt 105.2 kg (232 lb)    SpO2 97%    BMI 36.34 kg/m2             Heart: regular,   Lungs clear  Extremity exam: in long arm splint, radial, ulnar, median nerve intact    Impression: For revision ORIF of left humerus  Plan:   Risks and benefits discussed including infection, bleeding, damage to nerves and vessels, continued non-union, need of revision operation was discussed and she desires to proceed.

## 2018-01-05 NOTE — OP NOTES
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 PROCEDURES PERFORMED:  1. Revision fixation of a left humeral shaft nonunion. 2.  Removal of hardware, deep, left humerus. 3.  Anterior transposition of the radial nerve. ANESTHESIA:  General with regional sedation. ESTIMATED BLOOD LOSS: 300 cc    SPECIMENS REMOVED: none    COMPLICATIONS: none    INDICATIONS:  This is a 64-year-old female with a humerus shaft fracture which underwent ORIF several months ago. Unfortunately, has a nonunion, suffering fatigue fractures of her humeral screws. She presents for revision ORIF. Risks of surgery including infection, bleeding, damage to nerves or vessels, need for revision operation, continued nonunion and malunion was discussed and she desired to proceed. PROCEDURE IN DETAIL:  After informed consent, the patient was taken to the operating room and placed supine on the operating table. After adequate sedation, she was intubated. She was positioned in the lateral decubitus position with all bony prominences well padded. Left upper extremity was prepped and draped in sterile fashion using ChloraPrep. Appropriate timeout was taken. A longitudinal incision over the posterior aspect of the humerus was performed using previous incision, carried down through skin and subcutaneous tissues down to level of the triceps fascia. Lateral dissection was performed, finding the plate distally and working proximally, finding the radial nerve, carefully protected throughout the remainder of the case. Penrose drain was placed around it. The fracture site was easily visualized.   There were several pieces of dead cortical bone which were around it which were removed. Broken screws were removed within reason. Once the hardware was removed, the fracture was then debrided of all fracture hematoma as well as eschar, as the eschar surrounding the fracture would prevent healing. The fracture was then reduced, but still found to be ill-fitting due to the cortical fragments which were removed. A high-speed july was then used to july down the fragments in order to get a better fitting contour to the humerus. The radial nerve was then transposed anterior to the humerus. All tension was taken off the radial nerve. As this allowed for exposure of the posterior and lateral aspect of the humerus without having to worry about pinning the radial nerve up. The fracture was then reduced, the longitudinal plate was placed on the posterior aspect of the humerus, secured with provisional clamps and ensured to be in appropriate position. Once this was ensured, it was fixed with locking and nonlocking screws. Two interfragmentary screws were placed. Shanna bone graft and cancellous bone grafts were then mixed together and placed in and about the fracture site to augment healing. Narrow large fragment plate was then placed on the lateral aspect of the humerus and secured with locking and nonlocking screws after some contouring. Fluoroscopy was used to ensure appropriate position of all hardware. Once this was confirmed, the wound was then copiously irrigated and closed with 0 Vicryl, 2-0 Vicryl and staples. A medium Hemovac drain was placed. All sponge and needle counts were correct. She was placed in a long-arm splint and returned to the PACU in stable condition. There were no complications.       MD Kostas Bowers / Sudhakar  D: 01/05/2018 15:13     T: 01/05/2018 15:43  JOB #: [id number]

## 2018-01-05 NOTE — ANESTHESIA PROCEDURE NOTES
Peripheral Block    Start time: 1/5/2018 10:49 AM  End time: 1/5/2018 10:56 AM  Performed by: Dilma Mcmanus  Authorized by: Dilma Mcmanus       Pre-procedure: Indications: at surgeon's request and post-op pain management    Preanesthetic Checklist: patient identified, risks and benefits discussed, site marked, timeout performed, anesthesia consent given and patient being monitored      Block Type:   Block Type:  Brachial plexus  Laterality:  Left  Monitoring:  Standard ASA monitoring, continuous pulse ox, responsive to questions, oxygen, frequent vital sign checks and heart rate  Injection Technique:  Single shot  Procedures: ultrasound guided    Patient Position: seated  Prep: chlorhexidine    Needle Type:  Ultraplex  Needle Gauge:  22 G  Needle Localization:  Ultrasound guidance  Medication Injected:  0.5%  ropivacaine  Volume (mL):  20  Add'l Medication Injected:  1.5%  mepivacaine  Volume (mL):  10    Assessment:  Number of attempts:  1  Injection Assessment:  Incremental injection every 5 mL, negative aspiration for blood, local visualized surrounding nerve on ultrasound, no paresthesia, ultrasound image on chart and no intravascular symptoms  Patient tolerance:  Patient tolerated the procedure well with no immediate complications  mepivicaine injected at ICB distribution    For Vitals see nursing notes.      Pratik Wells MD  11:01 AM

## 2018-01-05 NOTE — PROGRESS NOTES
TRANSFER - IN REPORT:    Verbal report received from [address]  being received from PACU for routine post - op      Report consisted of patients Situation, Background, Assessment and   Recommendations(SBAR). Information from the following report(s) SBAR, OR Summary and Intake/Output was reviewed with the receiving nurse. Opportunity for questions and clarification was provided. Assessment completed upon patients arrival to unit and care assumed. Received pt via bed awake and alert. L arm in sling with ace wrap and GULSHAN drain intact. Pt able to move and feel fingers. Wearing O2 at 2L via n/c. Oriented to call bell, phone and IS with pt giving return demonstration. Pt falls back to sleep very easily.

## 2018-01-05 NOTE — PROGRESS NOTES
1800 - performed admission assessment. No c/o pain & rated discomfort 2/10. Ambulated pt to bathroom & pt had unsteady gait.  at bedside feeding pt. Pt complaint of discomfort on left lateral shin. Removed SCDs to give pt relief. Emptied GULSHAN drain.

## 2018-01-05 NOTE — IP AVS SNAPSHOT
Milly Watts 
 
 
 [address] [place] [address] Patient: Dallas Chamorro MRN: [id number] :1965 About your hospitalization You were admitted on:  2018 You last received care in the:  [address] [place] [address],[address] You were discharged on:  2018 Why you were hospitalized Your primary diagnosis was:  Not on File Your diagnoses also included:  Fracture Of Shaft Of Right Humerus With Delayed Healing Follow-up Information Follow up With Details Comments Contact Info Ingrid Buchanan MD   [address] [place] [id number] 
[phone] Sivakumar Haywood MD  10-14 days for follow up appointment [address] [place] [id number] [phone] Discharge Orders None A check naheed indicates which time of day the medication should be taken. My Medications START taking these medications Instructions Each Dose to Equal  
 Morning Noon Evening Bedtime  
 oxyCODONE-acetaminophen 5-325 mg per tablet Commonly known as:  PERCOCET Your last dose was: Your next dose is: Take 1-2 Tabs by mouth every four (4) hours as needed for Pain. Max Daily Amount: 12 Tabs. 1-2 Tab CONTINUE taking these medications Instructions Each Dose to Equal  
 Morning Noon Evening Bedtime ARIPiprazole 30 mg tablet Commonly known as:  ABILIFY Your last dose was: Your next dose is: Take 30 mg by mouth daily. 30 mg  
    
   
   
   
  
 atorvastatin 80 mg tablet Commonly known as:  LIPITOR Your last dose was: Your next dose is: Take 80 mg by mouth nightly. 80 mg  
    
   
   
   
  
 BYDUREON 2 mg/0.65 mL Pnij Generic drug:  exenatide microspheres Your last dose was: Your next dose is: 2 mg by SubCUTAneous route every seven (7) days. 2 mg Calcium Carbonate-Vit D3-Min 600 mg calcium- 400 unit Tab Your last dose was: Your next dose is: Take  by mouth two (2) times a day. clonazePAM 1 mg tablet Commonly known as:  Reema Trevizo Your last dose was: Your next dose is: Take 1 mg by mouth two (2) times a day. 1 mg CYMBALTA 60 mg capsule Generic drug:  DULoxetine Your last dose was: Your next dose is: Take 60 mg by mouth two (2) times a day. 60 mg DEXEDRINE 10 mg tablet Generic drug:  dextroamphetamine Your last dose was: Your next dose is: Take 20 mg by mouth two (2) times a day. 20 mg  
    
   
   
   
  
 FIORICET PO Your last dose was: Your next dose is: Take  by mouth. FLOVENT  mcg/actuation inhaler Generic drug:  fluticasone Your last dose was: Your next dose is: Take 2 Puffs by inhalation every twelve (12) hours. 2 Puff  
    
   
   
   
  
 gabapentin 300 mg capsule Commonly known as:  NEURONTIN Your last dose was: Your next dose is: Take 600 mg by mouth three (3) times daily. 600 mg  
    
   
   
   
  
 lisinopril 10 mg tablet Commonly known as:  Michael Richmond Your last dose was: Your next dose is: Take 10 mg by mouth daily. 10 mg  
    
   
   
   
  
 LUNESTA 3 mg tablet Generic drug:  eszopiclone Your last dose was: Your next dose is: Take 3 mg by mouth nightly. 3 mg  
    
   
   
   
  
 metFORMIN 500 mg tablet Commonly known as:  GLUCOPHAGE Your last dose was: Your next dose is: Take 500 mg by mouth two (2) times daily (with meals).   
 500 mg  
    
   
   
   
 metoprolol succinate 25 mg XL tablet Commonly known as:  TOPROL-XL Your last dose was: Your next dose is: Take 25 mg by mouth daily. 25 mg MIRALAX 17 gram packet Generic drug:  polyethylene glycol Your last dose was: Your next dose is: Take 17 g by mouth two (2) times a day. 17 g MIRAPEX 0.125 mg tablet Generic drug:  pramipexole Your last dose was: Your next dose is: Take 0.125 mg by mouth nightly. 0.125 mg  
    
   
   
   
  
 NIFEdipine ER 30 mg ER tablet Commonly known as:  PROCARDIA XL Your last dose was: Your next dose is: Take  by mouth daily. OMEGA 3 PO Your last dose was: Your next dose is: Take  by mouth daily. PRENATAL VITAMIN PO Your last dose was: Your next dose is: Take  by mouth daily. PREVACID 30 mg capsule Generic drug:  lansoprazole Your last dose was: Your next dose is: Take 60 mg by mouth Daily (before breakfast). 60 mg PROVENTIL HFA 90 mcg/actuation inhaler Generic drug:  albuterol Your last dose was: Your next dose is: Take  by inhalation every four (4) hours as needed for Wheezing. Senna 8.6 mg tablet Generic drug:  senna Your last dose was: Your next dose is: Take 2 Tabs by mouth daily. 2 Tab TECFIDERA 240 mg Cpdr  
Generic drug:  dimethyl fumarate Your last dose was: Your next dose is: Take  by mouth two (2) times a day. traMADol 50 mg tablet Commonly known as:  ULTRAM  
   
Your last dose was: Your next dose is: Take 100 mg by mouth every six (6) hours as needed for Pain. 100 mg WELLBUTRIN  mg SR tablet Generic drug:  buPROPion SR Your last dose was: Your next dose is: Take 150 mg by mouth two (2) times a day. 150 mg  
    
   
   
   
  
 ZyrTEC 10 mg Cap Generic drug:  Cetirizine Your last dose was: Your next dose is: Take  by mouth two (2) times a day. Where to Get Your Medications Information on where to get these meds will be given to you by the nurse or doctor. ! Ask your nurse or doctor about these medications  
  oxyCODONE-acetaminophen 5-325 mg per tablet Discharge Instructions Broken Arm: Care Instructions Your Care Instructions Fractures can range from a small, hairline crack, to a bone or bones broken into two or more pieces. Your treatment depends on how bad the break is. Your doctor may have put your arm in a splint or cast to allow it to heal or to keep it stable until you see another doctor. It may take weeks or months for your arm to heal. You can help your arm heal with some care at home. You heal best when you take good care of yourself. Eat a variety of healthy foods, and don't smoke. You may have had a sedative to help you relax. You may be unsteady after having sedation. It can take a few hours for the medicine's effects to wear off. Common side effects of sedation include nausea, vomiting, and feeling sleepy or tired. The doctor has checked you carefully, but problems can develop later. If you notice any problems or new symptoms, get medical treatment right away. Follow-up care is a key part of your treatment and safety. Be sure to make and go to all appointments, and call your doctor if you are having problems. It's also a good idea to know your test results and keep a list of the medicines you take. How can you care for yourself at home? · If the doctor gave you a sedative: ¨ For 24 hours, don't do anything that requires attention to detail. It takes time for the medicine's effects to completely wear off. ¨ For your safety, do not drive or operate any machinery that could be dangerous. Wait until the medicine wears off and you can think clearly and react easily. · Put ice or a cold pack on your arm for 10 to 20 minutes at a time. Try to do this every 1 to 2 hours for the next 3 days (when you are awake). Put a thin cloth between the ice and your cast or splint. Keep the cast or splint dry. · Follow the cast care instructions your doctor gives you. If you have a splint, do not take it off unless your doctor tells you to. · Be safe with medicines. Take pain medicines exactly as directed. ¨ If the doctor gave you a prescription medicine for pain, take it as prescribed. ¨ If you are not taking a prescription pain medicine, ask your doctor if you can take an over-the-counter medicine. · Prop up your arm on pillows when you sit or lie down in the first few days after the injury. Keep the arm higher than the level of your heart. This will help reduce swelling. · Follow instructions for exercises to keep your arm strong. · Wiggle your fingers and wrist often to reduce swelling and stiffness. When should you call for help? Call 911 anytime you think you may need emergency care. For example, call if: 
? · You are very sleepy and you have trouble waking up. ?Call your doctor now or seek immediate medical care if: 
? · You have new or worse nausea or vomiting. ? · You have new or worse pain. ? · Your hand or fingers are cool or pale or change color. ? · Your cast or splint feels too tight. ? · You have tingling, weakness, or numbness in your hand or fingers. ? Watch closely for changes in your health, and be sure to contact your doctor if: 
? · You do not get better as expected. ? · You have problems with your cast or splint. Where can you learn more? Go to http://avni-kya.info/. Enter X845 in the search box to learn more about \"Broken Arm: Care Instructions. \" Current as of: March 21, 2017 Content Version: 11.4 © 1795-5490 [employer]. Care instructions adapted under license by [employer] (which disclaims liability or warranty for this information). If you have questions about a medical condition or this instruction, always ask your healthcare professional. [address] any warranty or liability for your use of this information. DISCHARGE SUMMARY from Nurse PATIENT INSTRUCTIONS: 
 
 
F-face looks uneven A-arms unable to move or move unevenly S-speech slurred or non-existent T-time-call 911 as soon as signs and symptoms begin-DO NOT go Back to bed or wait to see if you get better-TIME IS BRAIN. Warning Signs of HEART ATTACK Call 911 if you have these symptoms: 
? Chest discomfort. Most heart attacks involve discomfort in the center of the chest that lasts more than a few minutes, or that goes away and comes back. It can feel like uncomfortable pressure, squeezing, fullness, or pain. ? Discomfort in other areas of the upper body. Symptoms can include pain or discomfort in one or both arms, the back, neck, jaw, or stomach. ? Shortness of breath with or without chest discomfort. ? Other signs may include breaking out in a cold sweat, nausea, or lightheadedness. Don't wait more than five minutes to call [address]! Fast action can save your life.  Calling 911 is almost always the fastest way to get lifesaving treatment. Emergency Medical Services staff can begin treatment when they arrive  up to an hour sooner than if someone gets to the hospital by car. The discharge information has been reviewed with the patient. The patient verbalized understanding. Discharge medications reviewed with the patient and appropriate educational materials and side effects teaching were provided. ___________________________________________________________________________________________________________________________________ MyChart Activation Thank you for enrolling in [facility] Please follow the instructions below to securely access your online medical record. [employer] allows you to send messages to your doctor, view your test results, renew your prescriptions, schedule appointments, and more. How Do I Sign Up? 1. In your internet browser, go to [email or web address]. 2. Click on the First Time User? Click Here link in the Sign In box. You will see the New Member Sign Up page. 3. Enter your U[employer] Access Code exactly as it appears below. You will not need to use this code after youve completed the sign-up process. If you do not sign up before the expiration date, you must request a new code. [employer] Access Code: [email or web address] Expires: 4/5/2018  5:39 PM  
 
4. Enter the last four digits of your Social Security Number (xxxx) and Date of Birth (mm/dd/yyyy) as indicated and click Submit. You will be taken to the next sign-up page. 5. Create a [employer] ID. This will be your [employer] login ID and cannot be changed, so think of one that is secure and easy to remember. 6. Create a [employer] password. You can change your password at any time. 7. Enter your Password Reset Question and Answer. This can be used at a later time if you forget your password. 8. Enter your e-mail address. You will receive e-mail notification when new information is available in [address] 9. Click Sign Up. You can now view your medical record. Additional Information Remember, [employer] is NOT to be used for urgent needs. For medical emergencies, dial 911. Now available from your iPhone and Android! Patient armband removed and shredded [employer] Announcement We are excited to announce that we are making your provider's discharge notes available to you in [employer]. You will see these notes when they are completed and signed by the physician that discharged you from your recent hospital stay. If you have any questions or concerns about any information you see in [employer], please call the Health Information Department where you were seen or reach out to your Primary Care Provider for more information about your plan of care. Introducing [facility]! William Galeano introduces [employer] patient portal. Now you can access parts of your medical record, email your doctor's office, and request medication refills online. 1. In your internet browser, go to [email or web address] 2. Click on the First Time User? Click Here link in the Sign In box. You will see the New Member Sign Up page. 3. Enter your [employer] Access Code exactly as it appears below. You will not need to use this code after youve completed the sign-up process. If you do not sign up before the expiration date, you must request a new code. · [employer] Access Code: [email or web address] Expires: 4/5/2018  5:39 PM 
 
4. Enter the last four digits of your Social Security Number (xxxx) and Date of Birth (mm/dd/yyyy) as indicated and click Submit. You will be taken to the next sign-up page. 5. Create a [employer] ID. This will be your [employer] login ID and cannot be changed, so think of one that is secure and easy to remember. 6. Create a [employer] password. You can change your password at any time. 7. Enter your Password Reset Question and Answer.  This can be used at a later time if you forget your password. 8. Enter your e-mail address. You will receive e-mail notification when new information is available in [address] 9. Click Sign Up. You can now view and download portions of your medical record. 10. Click the Download Summary menu link to download a portable copy of your medical information. If you have questions, please visit the Frequently Asked Questions section of the MyChart website. Remember, [employer] is NOT to be used for urgent needs. For medical emergencies, dial 911. Now available from your iPhone and Android! Unresulted Labs-Please follow up with your PCP about these lab tests Order Current Status NC XR TECHNOLOGIST SERVICE In process XR HUMERUS LT In process Providers Seen During Your Hospitalization Provider Specialty Primary office phone Vita Cheng, [address] [place] [address] Orthopedic Surgery [phone] Your Primary Care Physician (PCP) Primary Care Physician Office Phone Office Fax Laniey Thurman [phone] You are allergic to the following Allergen Reactions Doxycycline Other (comments) Gyne-Lotrimin (Clotrimazole) Hives Hives in vagina Monistat 1 (Tioconazole) (Tioconazole) Hives Hives in vagina Sulfa (Sulfonamide Antibiotics) Other (comments) Recent Documentation Height Weight BMI OB Status Smoking Status 1.702 m 105.2 kg 36.34 kg/m2 Hysterectomy Never Smoker Emergency Contacts Name Discharge Info Relation Home Work Mobile LandJosé DISCHARGE CAREGIVER [3] Spouse [3]   [phone] [address]  Parent [1] [phone] Patient Belongings  The following personal items are in your possession at time of discharge: 
  Dental Appliances: None  Visual Aid: None      Home Medications: None   Jewelry: None, Bracelet (PT states that she has a braclets under her current dressing )  Clothing: Footwear, Undergarments, Socks, Shirt, Pants (scarf)    Other Valuables:  (will be given to ther  ) Discharge Instructions Attachments/References ORIF (OPEN REDUCTION WITH INTERNAL FIXATION): POST-OP (ENGLISH) Patient Handouts Open Reduction With Internal Fixation of a Limb: What to Expect at Home Your Recovery You can expect some pain and swelling around the cut (incision) the doctor made. This should get better within a few days after your surgery. But it is normal to have some pain for 2 to 3 weeks after surgery and mild pain for up to 6 weeks after surgery. How soon you can return to work and your normal routine depends on your job and how long it takes the bone to heal. For example, if you have a fractured leg and you sit at work, you may be able to go back in 1 to 2 weeks. But if your job requires you to walk or stand a lot, you will need to wait until your fracture has healed before you go back to work. This care sheet gives you a general idea about how long it will take for you to recover. But each person recovers at a different pace. Follow the steps below to get better as quickly as possible. How can you care for yourself at home? Activity ? · Rest when you feel tired. Getting enough sleep will help you recover. ? · Increase your activity as recommended by your doctor. Being active boosts blood flow and helps prevent pneumonia and constipation. It is usually okay to exercise other parts of your body as soon as you feel well enough. ? · Avoid putting weight on your repaired bone until your doctor says it is okay. ? · You will probably need to take 1 to 2 weeks off from work. It depends on the type of work you do and how you feel. ? · Do not shower for 1 or 2 days after surgery. When you shower, keep your dressing and incisions dry.  If you have a cast, tape a sheet of plastic to cover it so that it does not get wet. It may help to sit on a shower stool. ? · Do not take a bath, swim, use a hot tub, or soak your affected limb until your incision is healed. This usually takes 1 to 2 weeks. Diet ? · You can eat your normal diet. If your stomach is upset, try bland, low-fat foods like plain rice, broiled chicken, toast, and yogurt. Medicines ? · Your doctor will tell you if and when you can restart your medicines. He or she will also give you instructions about taking any new medicines. ? · If you take blood thinners, such as warfarin (Coumadin), clopidogrel (Plavix), or aspirin, be sure to talk to your doctor. He or she will tell you if and when to start taking those medicines again. Make sure that you understand exactly what your doctor wants you to do. ? · Take pain medicines exactly as directed. ¨ If the doctor gave you a prescription medicine for pain, take it as prescribed. ¨ If you are not taking a prescription pain medicine, ask your doctor if you can take an over-the-counter medicine. ? · If you think your pain medicine is making you sick to your stomach: 
¨ Take your medicine after meals (unless your doctor has told you not to). ¨ Ask your doctor for a different pain medicine. ? · If your doctor prescribed antibiotics, take them as directed. Do not stop taking them just because you feel better. You need to take the full course of antibiotics. Incision care ? · If you have strips of tape on the incision, leave the tape on for a week or until it falls off.  
? · If you do not have a cast, clean the incision 2 times a day after your doctor allows you to remove the bandage. Use only soap and water to clean the incision unless your doctor gives you different instructions. Don't use hydrogen peroxide or alcohol, which can slow healing. Exercise ?  · Do exercises as instructed by your doctor or physical therapist. These exercises will help keep your muscles strong and your joints flexible while your bone is healing. ? · Wiggle your fingers or toes on the injured arm or leg often. This helps reduce swelling and stiffness. Ice and elevation ? · Prop up the injured arm or leg on a pillow when you ice it or anytime you sit or lie down during the first 1 to 2 weeks after your surgery. Try to keep it above the level of your heart. This will help reduce swelling and pain. Other instructions ? · If you have a cast or splint: 
¨ Keep it dry. ¨ If you have a removable splint, ask your doctor if it is okay to take it off to bathe. Your doctor may want you to keep it on as much as possible. Be careful not to put the splint on too tight. ¨ Do not stick objects such as pencils or coat hangers in your cast or splint to scratch your skin. ¨ Do not put powder into your cast or splint to relieve itchy skin. ¨ Never cut or alter your cast or splint. Follow-up care is a key part of your treatment and safety. Be sure to make and go to all appointments, and call your doctor if you are having problems. It's also a good idea to know your test results and keep a list of the medicines you take. When should you call for help? Call 911 anytime you think you may need emergency care. For example, call if: 
? · You passed out (lost consciousness). ? · You have severe trouble breathing. ? · You have sudden chest pain and shortness of breath, or you cough up blood. ?Call your doctor now or seek immediate medical care if: 
? · You have pain that does not get better after you take pain medicine. ? · Your fingers or toes on the injured arm or leg are cool, pale, or change color. ? · You have tingling or numbness in your fingers or toes. ? · You cannot move your fingers or toes. ? · Your cast or splint feels too tight. ? · The skin under your cast or splint is burning or stinging. ? · You have signs of infection, such as: ¨ Increased pain, swelling, warmth, or redness. ¨ Red streaks leading from the incision. ¨ Pus draining from the incision. ¨ A fever. ? · You have drainage or a bad smell coming from the cast or splint. ? · You have signs of a blood clot, such as: 
¨ Pain in your calf, back of the knee, thigh, or groin. ¨ Redness and swelling in your leg or groin. ? Watch closely for any changes in your health, and be sure to contact your doctor if: 
? · You have any problems with your cast or splint. Where can you learn more? Go to http://avni-kya.info/. Enter R510 in the search box to learn more about \"Open Reduction With Internal Fixation of a Limb: What to Expect at Home. \" Current as of: March 21, 2017 Content Version: 11.4 © 4408-5197 [employer]. Care instructions adapted under license by [employer] (which disclaims liability or warranty for this information). If you have questions about a medical condition or this instruction, always ask your healthcare professional. [address] any warranty or liability for your use of this information. Please provide this summary of care documentation to your next provider. Signatures-by signing, you are acknowledging that this After Visit Summary has been reviewed with you and you have received a copy. Patient Signature:  ____________________________________________________________ Date:  ____________________________________________________________  
  
[facility] Provider Signature:  ____________________________________________________________ Date:  ____________________________________________________________

## 2018-01-05 NOTE — PERIOP NOTES
Pt arrived with a splint on Left Arm. During PreOp interview, pt stated that she believed there was a bracelet on her left arm under the splint.   Upon removing splint in OR, no bracelet was found

## 2018-01-06 ENCOUNTER — APPOINTMENT (OUTPATIENT)
Dept: GENERAL RADIOLOGY | Age: 53
DRG: 493 | End: 2018-01-06
Attending: PHYSICIAN ASSISTANT
Payer: MEDICARE

## 2018-01-06 PROCEDURE — 74011250636 HC RX REV CODE- 250/636: Performed by: ORTHOPAEDIC SURGERY

## 2018-01-06 PROCEDURE — 74011250637 HC RX REV CODE- 250/637: Performed by: ORTHOPAEDIC SURGERY

## 2018-01-06 PROCEDURE — 65270000029 HC RM PRIVATE

## 2018-01-06 PROCEDURE — 73060 X-RAY EXAM OF HUMERUS: CPT

## 2018-01-06 RX ADMIN — Medication 5 ML: at 04:06

## 2018-01-06 RX ADMIN — OXYCODONE HYDROCHLORIDE AND ACETAMINOPHEN 2 TABLET: 5; 325 TABLET ORAL at 00:39

## 2018-01-06 RX ADMIN — DOCUSATE SODIUM 100 MG: 100 CAPSULE, LIQUID FILLED ORAL at 09:25

## 2018-01-06 RX ADMIN — CEFAZOLIN SODIUM 2 G: 2 SOLUTION INTRAVENOUS at 04:05

## 2018-01-06 RX ADMIN — OXYCODONE HYDROCHLORIDE AND ACETAMINOPHEN 2 TABLET: 5; 325 TABLET ORAL at 04:05

## 2018-01-06 RX ADMIN — MORPHINE SULFATE 2 MG: 2 INJECTION, SOLUTION INTRAMUSCULAR; INTRAVENOUS at 12:51

## 2018-01-06 RX ADMIN — CEFAZOLIN SODIUM 2 G: 2 SOLUTION INTRAVENOUS at 11:39

## 2018-01-06 RX ADMIN — Medication 10 ML: at 17:12

## 2018-01-06 RX ADMIN — OXYCODONE HYDROCHLORIDE AND ACETAMINOPHEN 2 TABLET: 5; 325 TABLET ORAL at 09:25

## 2018-01-06 RX ADMIN — DOCUSATE SODIUM 100 MG: 100 CAPSULE, LIQUID FILLED ORAL at 17:11

## 2018-01-06 RX ADMIN — MORPHINE SULFATE 2 MG: 2 INJECTION, SOLUTION INTRAMUSCULAR; INTRAVENOUS at 17:11

## 2018-01-06 RX ADMIN — CEFAZOLIN SODIUM 2 G: 2 SOLUTION INTRAVENOUS at 21:51

## 2018-01-06 RX ADMIN — MORPHINE SULFATE 2 MG: 2 INJECTION, SOLUTION INTRAMUSCULAR; INTRAVENOUS at 21:51

## 2018-01-06 RX ADMIN — Medication 5 ML: at 04:54

## 2018-01-06 NOTE — PROGRESS NOTES
Problem: Falls - Risk of  Goal: *Absence of Falls  Document Chris Fall Risk and appropriate interventions in the flowsheet.    Outcome: Progressing Towards Goal  Fall Risk Interventions:  Mobility Interventions: Bed/chair exit alarm, Patient to call before getting OOB         Medication Interventions: Bed/chair exit alarm, Teach patient to arise slowly    Elimination Interventions: Bed/chair exit alarm, Call light in reach, Patient to call for help with toileting needs

## 2018-01-06 NOTE — ROUTINE PROCESS
Bedside and Verbal shift change report given to Dian Ziegler RN (oncoming nurse) by Jacquelyn Shay RN (offgoing nurse). Report included the following information SBAR, Intake/Output, MAR, Recent Results and Med Rec Status.

## 2018-01-06 NOTE — DISCHARGE SUMMARY
Discharge Summary    Admit Date: 2018  Discharge Date:  2018     Patient ID:   Name:  Nehemiah Alan      Age:  45 y.o.    :  1965    Admitting Diagnosis: s42.302s left humerous fx, t84.84xa painful hardware  Fracture of shaft of right humerus with delayed healing     Post Operative Day: 1    Operative Procedures:  OH REMOVAL DEEP IMPLANT [] (HUMERUS OPEN REDUCTION INTERNAL FIXATION)  OPEN FIXATN MID HUMERUS FRACTURE [85942]  ELECT BONE STIM INVASIVE [71356]      Isolation Precautions:   Not required. Patient is not currently contagious. Physical Exam on Discharge:  Visit Vitals    BP (!) 151/96 (BP 1 Location: Right arm, BP Patient Position: Sitting)    Pulse 100    Temp 97.6 °F (36.4 °C)    Resp 16    Ht 5' 7\" (1.702 m)    Wt 105.2 kg (232 lb)    SpO2 92%    BMI 36.34 kg/m2         General: in no apparent distress   Wound: clean, dry   Extremities:  Neurovascular intact    Dressing:  Dry   DVT Exam:   No evidence of DVT seen on physical exam;  compartments soft and NT. Relevant labs within last 72 hours:    CBC w/Diff    No results found for: WBC, RBC, HCT, MCV, MCH, MCHC, RDW, HCTEXT No results found for: BANDS, LYMPHOCYTES, MONOS, EOS, BASOS, BLAST, RDW       BMP   No results found for: NA, POTASSIUM, CHLORIDE, CO2, BUN       Coagulation   No results found for: INR, APTT           Condition at discharge: Afebrile  Ambulating  Eating, Drinking, Voiding  Stable    Current Discharge Medication List      START taking these medications    Details   oxyCODONE-acetaminophen (PERCOCET) 5-325 mg per tablet Take 1-2 Tabs by mouth every four (4) hours as needed for Pain. Max Daily Amount: 12 Tabs.   Qty: 50 Tab, Refills: 0    Associated Diagnoses: Closed displaced comminuted fracture of shaft of right humerus with delayed healing, subsequent encounter         CONTINUE these medications which have NOT CHANGED    Details   traMADol (ULTRAM) 50 mg tablet Take 100 mg by mouth every six (6) hours as needed for Pain.      eszopiclone (LUNESTA) 3 mg tablet Take 3 mg by mouth nightly. Cetirizine (ZYRTEC) 10 mg cap Take  by mouth two (2) times a day. atorvastatin (LIPITOR) 80 mg tablet Take 80 mg by mouth nightly. metFORMIN (GLUCOPHAGE) 500 mg tablet Take 500 mg by mouth two (2) times daily (with meals). NIFEdipine ER (PROCARDIA XL) 30 mg ER tablet Take  by mouth daily. metoprolol succinate (TOPROL-XL) 25 mg XL tablet Take 25 mg by mouth daily. lisinopril (PRINIVIL, ZESTRIL) 10 mg tablet Take 10 mg by mouth daily. lansoprazole (PREVACID) 30 mg capsule Take 60 mg by mouth Daily (before breakfast). dimethyl fumarate (TECFIDERA) 240 mg cpDR Take  by mouth two (2) times a day. PRENATAL VIT CALC,IRON,FOLIC (PRENATAL VITAMIN PO) Take  by mouth daily. Calcium Carbonate-Vit D3-Min 600 mg calcium- 400 unit tab Take  by mouth two (2) times a day. buPROPion SR (WELLBUTRIN SR) 150 mg SR tablet Take 150 mg by mouth two (2) times a day. clonazePAM (KLONOPIN) 1 mg tablet Take 1 mg by mouth two (2) times a day. dextroamphetamine (DEXEDRINE) 10 mg tablet Take 20 mg by mouth two (2) times a day. DULoxetine (CYMBALTA) 60 mg capsule Take 60 mg by mouth two (2) times a day.      gabapentin (NEURONTIN) 300 mg capsule Take 600 mg by mouth three (3) times daily. ARIPiprazole (ABILIFY) 30 mg tablet Take 30 mg by mouth daily. polyethylene glycol (MIRALAX) 17 gram packet Take 17 g by mouth two (2) times a day. senna (SENNA) 8.6 mg tablet Take 2 Tabs by mouth daily. pramipexole (MIRAPEX) 0.125 mg tablet Take 0.125 mg by mouth nightly. exenatide microspheres (BYDUREON) 2 mg/0.65 mL pnij 2 mg by SubCUTAneous route every seven (7) days. OMEGA-3S/DHA/EPA/FISH OIL (OMEGA 3 PO) Take  by mouth daily. fluticasone (FLOVENT HFA) 110 mcg/actuation inhaler Take 2 Puffs by inhalation every twelve (12) hours. BUTALB/ACETAMINOPHEN/CAFFEINE (FIORICET PO) Take  by mouth. albuterol (PROVENTIL HFA) 90 mcg/actuation inhaler Take  by inhalation every four (4) hours as needed for Wheezing. PCP:  Yaz Kwon MD        Disposition:  Clear for discharge to home, if clear by medicine service. Follow-up in the office in 10 days with Dr. Angel Christiansen; call [phone] to schedule appointment.      Wound Care: open to air when no drainage           -Libra Zayas PA-C  1/6/2018  Office [phone]  Cell [phone]

## 2018-01-06 NOTE — PROGRESS NOTES
1200- Called LOUANN Smyth for confirmation of discharge order placed in the morning. I notified her that they were told by Dr. Carole Vargas, they would be discharged Sunday. She told me to cancel the discharge and the patient will be discharged tomorrow.

## 2018-01-06 NOTE — PROGRESS NOTES
Ortho Note POD 1 ORIF left humerus fracture  S: No new complaints. Still some left arm pain. O:AF, VSS  Good capillary refill of all fingers left hand. Pt moves all fingers, sensation to touch appears intact in all fingers. Drain removed  Likely home tomorrow.   Bonita Saavedra MD

## 2018-01-06 NOTE — ACP (ADVANCE CARE PLANNING)
Patient has designated her , Savanah Flores, to participate in his/her discharge plan and to receive any needed information.      Name: Savanah Flores  Address:  Phone number: [phone]

## 2018-01-06 NOTE — PROGRESS NOTES
1930: Bedside and Verbal shift change report given to Jac Lucas RN (oncoming nurse) by Alda Garcia RN (offgoing nurse). Report included the following information SBAR, Kardex, OR Summary, Intake/Output, MAR and Recent Results. Assumed care of patient. Patient in bed, eating and watching TV with spouse at bedside. Patient asking about home medications, specifically Metformin. Informed patient that more than likely she will not be taking Metformin while in the hospital. Patient states that she brought medication from home. Advised patient to not take medications from home and only take medication the staff administers unless directed otherwise. Patient verbalized understanding. No other concerns and/or complaints at this time. Bed in low position, wheels locked, call bell and phone within reach. Will continue to monitor. 2041: Medicated per MAR.     5606: Patient resting in bed with eyes closed, chest rise symmetrical. No distress noted. Bed alarm activated and audible. Call light and phone within reach. 0038: Medicated per MAR. Rates pain 8/10.    0100: Patient states that medications aren't working. Advised patient that they were just administered approximately 20 mins ago and to allow time for medication to take effect. Informed patient I will check on her in 30 mins to address effectiveness of medication. 0130: Patient in bed snoring, did not arouse when I entered room. Will continue to monitor. 0224: Patient resting in bed with eyes closed. No distress noted. 0410: Medicated per MAR.    2366: Assisted to bathroom. 0700: Bedside and Verbal shift change report given to Dionicio Ahn RN (oncoming nurse) by Jac Lucas RN (offgoing nurse). Report included the following information SBAR, Kardex, OR Summary, Intake/Output, MAR and Recent Results.      Patient Vitals for the past 12 hrs:   Temp Pulse Resp BP SpO2   01/06/18 0432 98.3 °F (36.8 °C) 87 16 123/87 94 %   01/06/18 0108 97.8 °F (36.6 °C) 100 16 133/82 92 %

## 2018-01-06 NOTE — PROGRESS NOTES
[facility] [address]   Discharge Planning/ Assessment    Reasons for Intervention: Interviewed patient. Verified demographics listed on face sheet with patient; all information correct. Patient stated their PCP is Dr. Johanna Onofre last seen two months ago with a follow up 1/11 . Patient lives with her . Patient independent with ADLs prior to admission. No use of DME prior to admission. Discharge plan is home.       High Risk Criteria  [] Yes  [x]No   Physician Referral  [] Yes  []No        Date    Nursing Referral  [] Yes  []No        Date    Patient/Family Request  [] Yes  []No        Date       Resources:    Medicare  [x] Yes  []No   Medicaid  [] Yes  []No   No Resources  [] Yes  []No   Private Insurance  [x] Yes  []No    Name/Phone Number    Other  [] Yes  []No        (i.e. Workman's Comp)         Prior Services:    Prior Services  [] Yes  [x]No   Home Health  [] Yes  []No   [address] [place]  [] Yes  []No        Number of [address]  [] Yes  []No       Meals on Wheels  [] Yes  []No   Office on Aging  [] Yes  []No   Transportation Services  [] Yes  []No   Nursing Home  [] Yes  []No        Nursing Home Name    [address]  [] Yes  []No        [address] Name    Other       Information Source:      Information obtained from  [x] Patient  [] Parent   [] Neftaly Held  [] Child  [] Spouse   [] Significant Other/Partner   [] Friend      [] EMS    [] Nursing Home Chart          [] Other:   Chart Review  [x] Yes  []No     Family/Support System:    Patient lives with  [] Alone    [x] Spouse   [] Significant Other  [] Children  [] Caretaker   [] Parent  [] Sibling     [] Other       Other Support System:    Is the patient responsible for care of others  [] Yes  []No   Information of person caring for patient on  discharge    Managers financial affairs independently  [] Yes  []No   If no, explain:      Status Prior to Admission:    Mental Status  [x] Awake  [] Alert  [] Oriented  [] Quiet/Calm [] Lethargic/Sedated   [] Disoriented  [] Restless/Anxious  [] Combative   Personal Care  [] Dependent  [x] Independent Personal Care  [] Requires Assistance   Meal Preparation Ability  [x] Independent   [] Standby Assistance   [] Minimal Assistance   [] Moderate Assistance  [] Maximum Assistance     [] Total Assistance   Chores  [x] Independent with Chores   [] N/A Nursing Home Resident   [] Requires Assistance   Bowel/Bladder  [x] Continent  [] Catheter  [] Incontinent  [] Ostomy Self-Care    [] Urine Diversion Self-Care  [] Maximum Assistance     [] Total Assistance   Number of Persons needed for assistance    DME at home  [] Verlon Breeding, Colonel Barnhart  [] Verlon Breeding, Straight   [] Commode    [] Bathroom/Grab Bars  [] Hospital Bed  [] Nebulizer  [] Oxygen           [] Raised Toilet Seat  [] Shower Chair  [] Side Rails for Bed   [] Tub Transfer Bench   [] Vaughn Mathis  [] Walker, Standard      [] Other:   Vendor      Treatment Presently Receiving:    Current Treatments  [] Chemotherapy  [] Dialysis  [] Insulin  [] IVAB [] IVF   [] O2  [] PCA   [] PT   [] RT   [] Tube Feedings   [] Wound Care     Psychosocial Evaluation:    Verbalized Knowledge of Disease Process  [x] Patient  []Family   Coping with Disease Process  [x] Patient  []Family   Requires Further Counseling Coping with Disease Process  [] Patient  []Family     Identified Projected Needs:    Home Health Aid  [] Yes  [x]No   Transportation  [] Yes  [x]No   Education  [] Yes  [x]No        Specific Education     Financial Counseling  [] Yes  [x]No   Inability to Care for Self/Will Require 24 hour care  [] Yes  [x]No   Pain Management  [] Yes  [x]No   Home Infusion Therapy  [] Yes  [x]No   Oxygen Therapy  [] Yes  [x]No   DME  [] Yes  [x]No   Long Term Care Placement  [] Yes  [x]No   Rehab  [] Yes  [x]No   Physical Therapy  [x] Yes  []No   Needs Anticipated At This Time  [] Yes  []No     Intra-Hospital Referral:    [facility]  [] Yes [x]No     [] Yes  [x]No   Patient Representative  [] Yes  [x]No   Staff for Teaching Needs  [] Yes  [x]No   Specialty Teaching Needs     Diabetic Educator  [] Yes  [x]No   Referral for Diabetic Educator Needed  [] Yes  [x]No  If Yes, place order for Nutritionist or Diabetic Consult     Tentative Discharge Plan:    Home with No Services  [x] Yes  []No   Home with [address]  [] Yes  []No        If Yes, specify type    Home Care Program  [] Yes  []No        If Yes, specify type    Meals on Wheels  [] Yes  []No   Office of Aging  [] Yes  []No   NHP  [] Yes  []No   Return to the Nursing Home  [] Yes  []No   Rehab Therapy  [] Yes  []No   Acute Rehab  [] Yes  []No   Subacute Rehab  [] Yes  []No   Private Care  [] Yes  []No   Substance Abuse Referral  [] Yes  []No   Transportation  [] Yes  []No   Chore Service  [] Yes  []No   Inpatient Hospice  [] Yes  []No   OP RT  [] Yes  [] No   OP Hemo  [] Yes  [] No   OP PT  [x] Yes  []No   Support Group  [] Yes  []No   Reach to Recovery  [] Yes  []No   OP Oncology Clinic  [] Yes  []No   Clinic Appointment  [] Yes  []No   DME  [] Yes  []No   Comments    Name of D/C Planner or  Given to Patient or Family Duran Estrada RN BSN   Phone Number         Extension    Date    Time    If you are discharged home, whom do you designate to participate in your discharge plan and receive any information needed?      Enter name of Juanita Aldana 48        Phone # of robel [phone]        Address of robel         Updated         Patient refused to designate any           individual

## 2018-01-07 VITALS
RESPIRATION RATE: 16 BRPM | BODY MASS INDEX: 36.41 KG/M2 | OXYGEN SATURATION: 94 % | HEART RATE: 95 BPM | TEMPERATURE: 98.2 F | HEIGHT: 67 IN | WEIGHT: 232 LBS | DIASTOLIC BLOOD PRESSURE: 84 MMHG | SYSTOLIC BLOOD PRESSURE: 132 MMHG

## 2018-01-07 PROCEDURE — 74011250637 HC RX REV CODE- 250/637: Performed by: ORTHOPAEDIC SURGERY

## 2018-01-07 PROCEDURE — 74011250636 HC RX REV CODE- 250/636: Performed by: ORTHOPAEDIC SURGERY

## 2018-01-07 RX ADMIN — OXYCODONE HYDROCHLORIDE AND ACETAMINOPHEN 2 TABLET: 5; 325 TABLET ORAL at 10:06

## 2018-01-07 RX ADMIN — Medication 10 ML: at 03:40

## 2018-01-07 RX ADMIN — DOCUSATE SODIUM 100 MG: 100 CAPSULE, LIQUID FILLED ORAL at 10:06

## 2018-01-07 RX ADMIN — Medication 10 ML: at 00:03

## 2018-01-07 RX ADMIN — CEFAZOLIN SODIUM 2 G: 2 SOLUTION INTRAVENOUS at 03:36

## 2018-01-07 RX ADMIN — OXYCODONE HYDROCHLORIDE AND ACETAMINOPHEN 2 TABLET: 5; 325 TABLET ORAL at 00:01

## 2018-01-07 RX ADMIN — OXYCODONE HYDROCHLORIDE AND ACETAMINOPHEN 2 TABLET: 5; 325 TABLET ORAL at 05:25

## 2018-01-07 NOTE — PROGRESS NOTES
Verbal, bedside shift change report given to Aman Martini RN(oncoming nurse) by Gladis Buenrostro  (offgoing nurse). Report included the following information SBAR, Kardex and MAR.

## 2018-01-07 NOTE — PROGRESS NOTES
Ortho Note POD 2 ORIF left humerus fracture  S: No new complaints. Still some left arm pain. O:AF, VSS  Good capillary refill of all fingers left hand. Pt moves all fingers, sensation to touch appears intact in all fingers. + swelling of digits left hand  X-rays left humerus look good all hardware intact  Home today.   Nathaly Gamboa MD

## 2018-01-07 NOTE — PROGRESS NOTES
Received bedside shift report from Gem Davis RN. Pt is asleep in bed, safety measures in place. White board updated, call bell within reach. Educated pt to call for assistance to restroom. 1000 Pt informed of discharge, will prepare pt, administered PRN Percocet, pt informed she can go after 1110. Pt verbalizes agreement. I have reviewed discharge instructions with the patient and spouse. The patient and spouse verbalized understanding.

## 2018-01-07 NOTE — DISCHARGE INSTRUCTIONS
Broken Arm: Care Instructions  Your Care Instructions  Fractures can range from a small, hairline crack, to a bone or bones broken into two or more pieces. Your treatment depends on how bad the break is. Your doctor may have put your arm in a splint or cast to allow it to heal or to keep it stable until you see another doctor. It may take weeks or months for your arm to heal. You can help your arm heal with some care at home. You heal best when you take good care of yourself. Eat a variety of healthy foods, and don't smoke. You may have had a sedative to help you relax. You may be unsteady after having sedation. It can take a few hours for the medicine's effects to wear off. Common side effects of sedation include nausea, vomiting, and feeling sleepy or tired. The doctor has checked you carefully, but problems can develop later. If you notice any problems or new symptoms, get medical treatment right away. Follow-up care is a key part of your treatment and safety. Be sure to make and go to all appointments, and call your doctor if you are having problems. It's also a good idea to know your test results and keep a list of the medicines you take. How can you care for yourself at home? · If the doctor gave you a sedative:  ¨ For 24 hours, don't do anything that requires attention to detail. It takes time for the medicine's effects to completely wear off. ¨ For your safety, do not drive or operate any machinery that could be dangerous. Wait until the medicine wears off and you can think clearly and react easily. · Put ice or a cold pack on your arm for 10 to 20 minutes at a time. Try to do this every 1 to 2 hours for the next 3 days (when you are awake). Put a thin cloth between the ice and your cast or splint. Keep the cast or splint dry. · Follow the cast care instructions your doctor gives you. If you have a splint, do not take it off unless your doctor tells you to. · Be safe with medicines.  Take pain medicines exactly as directed. ¨ If the doctor gave you a prescription medicine for pain, take it as prescribed. ¨ If you are not taking a prescription pain medicine, ask your doctor if you can take an over-the-counter medicine. · Prop up your arm on pillows when you sit or lie down in the first few days after the injury. Keep the arm higher than the level of your heart. This will help reduce swelling. · Follow instructions for exercises to keep your arm strong. · Wiggle your fingers and wrist often to reduce swelling and stiffness. When should you call for help? Call 911 anytime you think you may need emergency care. For example, call if:  ? · You are very sleepy and you have trouble waking up. ?Call your doctor now or seek immediate medical care if:  ? · You have new or worse nausea or vomiting. ? · You have new or worse pain. ? · Your hand or fingers are cool or pale or change color. ? · Your cast or splint feels too tight. ? · You have tingling, weakness, or numbness in your hand or fingers. ? Watch closely for changes in your health, and be sure to contact your doctor if:  ? · You do not get better as expected. ? · You have problems with your cast or splint. Where can you learn more? Go to http://avni-kya.info/. Enter C835 in the search box to learn more about \"Broken Arm: Care Instructions. \"  Current as of: March 21, 2017  Content Version: 11.4  © 5764-2173 [employer]. Care instructions adapted under license by [employer] (which disclaims liability or warranty for this information). If you have questions about a medical condition or this instruction, always ask your healthcare professional. [place] [address] any warranty or liability for your use of this information.     DISCHARGE SUMMARY from Nurse    PATIENT INSTRUCTIONS:    After general anesthesia or intravenous sedation, for 24 hours or while taking prescription Narcotics:  · Limit your activities  · Do not drive and operate hazardous machinery  · Do not make important personal or business decisions  · Do  not drink alcoholic beverages  · If you have not urinated within 8 hours after discharge, please contact your surgeon on call. Report the following to your surgeon:  · Excessive pain, swelling, redness or odor of or around the surgical area  · Temperature over 100.5  · Nausea and vomiting lasting longer than 4 hours or if unable to take medications  · Any signs of decreased circulation or nerve impairment to extremity: change in color, persistent  numbness, tingling, coldness or increase pain  · Any questions    What to do at Home:  Recommended activity: Activity as tolerated. If you experience any of the following symptoms such as high temperature, please follow up with primary care provider. *  Please give a list of your current medications to your Primary Care Provider. *  Please update this list whenever your medications are discontinued, doses are      changed, or new medications (including over-the-counter products) are added. *  Please carry medication information at all times in case of emergency situations. These are general instructions for a healthy lifestyle:    No smoking/ No tobacco products/ Avoid exposure to second hand smoke  Surgeon General's Warning:  Quitting smoking now greatly reduces serious risk to your health. Obesity, smoking, and sedentary lifestyle greatly increases your risk for illness    A healthy diet, regular physical exercise & weight monitoring are important for maintaining a healthy lifestyle    You may be retaining fluid if you have a history of heart failure or if you experience any of the following symptoms:  Weight gain of 3 pounds or more overnight or 5 pounds in a week, increased swelling in our hands or feet or shortness of breath while lying flat in bed.   Please call your doctor as soon as you notice any of these symptoms; do not wait until your next office visit. Recognize signs and symptoms of STROKE:    F-face looks uneven    A-arms unable to move or move unevenly    S-speech slurred or non-existent    T-time-call 911 as soon as signs and symptoms begin-DO NOT go       Back to bed or wait to see if you get better-TIME IS BRAIN. Warning Signs of HEART ATTACK     Call 911 if you have these symptoms:   Chest discomfort. Most heart attacks involve discomfort in the center of the chest that lasts more than a few minutes, or that goes away and comes back. It can feel like uncomfortable pressure, squeezing, fullness, or pain.  Discomfort in other areas of the upper body. Symptoms can include pain or discomfort in one or both arms, the back, neck, jaw, or stomach.  Shortness of breath with or without chest discomfort.  Other signs may include breaking out in a cold sweat, nausea, or lightheadedness. Don't wait more than five minutes to call 911 - MINUTES MATTER! Fast action can save your life. Calling 911 is almost always the fastest way to get lifesaving treatment. Emergency Medical Services staff can begin treatment when they arrive -- up to an hour sooner than if someone gets to the hospital by car. The discharge information has been reviewed with the patient. The patient verbalized understanding. Discharge medications reviewed with the patient and appropriate educational materials and side effects teaching were provided. ___________________________________________________________________________________________________________________________________    MyChart Activation    Thank you for enrolling in [address] Please follow the instructions below to securely access your online medical record. [employer] allows you to send messages to your doctor, view your test results, renew your prescriptions, schedule appointments, and more. How Do I Sign Up? 1.  In your internet browser, go to [email or web address]. 2. Click on the First Time User? Click Here link in the Sign In box. You will see the New Member Sign Up page. 3. Enter your [employer] Access Code exactly as it appears below. You will not need to use this code after youve completed the sign-up process. If you do not sign up before the expiration date, you must request a new code. [employer] Access Code: [email or web address]  Expires: 4/5/2018  5:39 PM     4. Enter the last four digits of your Social Security Number (xxxx) and Date of Birth (mm/dd/yyyy) as indicated and click Submit. You will be taken to the next sign-up page. 5. Create a [employer] ID. This will be your [employer] login ID and cannot be changed, so think of one that is secure and easy to remember. 6. Create a [employer] password. You can change your password at any time. 7. Enter your Password Reset Question and Answer. This can be used at a later time if you forget your password. 8. Enter your e-mail address. You will receive e-mail notification when new information is available in [address] 9. Click Sign Up. You can now view your medical record. Additional Information    Remember, [employer] is NOT to be used for urgent needs. For medical emergencies, dial 911. Now available from your iPhone and Android!     Patient armband removed and shredded

## 2019-07-25 ENCOUNTER — HOSPITAL ENCOUNTER (OUTPATIENT)
Dept: PHYSICAL THERAPY | Age: 54
Discharge: HOME | End: 2019-07-25
Payer: MEDICARE

## 2019-07-25 PROCEDURE — 97530 THERAPEUTIC ACTIVITIES: CPT

## 2019-07-25 PROCEDURE — 97110 THERAPEUTIC EXERCISES: CPT

## 2019-07-25 PROCEDURE — 97162 PT EVAL MOD COMPLEX 30 MIN: CPT

## 2019-07-25 NOTE — PROGRESS NOTES
PT KNEE EVAL AND TREATMENT    Patient Name: Charisse Cerda  Date:2019  : 1965  [x]  Patient  Verified  Payor: Rebekah Neighbours / Plan: [place] MED[place] & B / Product Type: Medicare /    In time:235  Out time:330  Total Treatment Time (min): 55  Total Timed Codes (min): 23  1:1 Treatment Time ([address] only): 55   Visit #: 1 of 10    Treatment Area: Gait instability [R26.81]  Bilateral knee pain [M25.561, M25.562]    SUBJECTIVE  Pain Level (0-10 on visual analog scale): 2  Any medication changes, allergies to medications, diagnosis change, or new procedure performed: see summary sheet for update  Subjective functional status/changes:  Assessment / key information:  Patient is a 52 y.o. female who presents to [facility] with a diagnosis of Gait instability [R26.81]  Bilateral knee pain [M25.561, M25.562]. Patient is her own historian. Living situation is as follows: lives with spouse in [address] [place] [address] with [address]. Occupation: NA.Pt presents with c/o bilateral localized anterior knee pain of several weeks duration of unknown cause. Pt reports \"clicking\" with walking and major contributing factor to increased pain is sit-stand transfers. Pain was described as localized \"burning\". Her prescription calls for gait training and balance training. Pt was mildly hesitant to partake in a balance evaluation, but after education on the importance of balance training, was open to such. Pt does not use an AD to ambulate. She owns a: SC, WC, RW, and a stairlift (which she does not use). Pt negotiates stairs with a step to pattern. She has a hx of multiple falls the most recent being three weeks ago. She denies any vertigo/vision issues. When asked about direction of falls, pt stated that most often they happen going to the left. Pt said she has difficulty getting off of the floor but is not interested in learning floor-mat transfers.  She checked \"no\" on the intake form when asked about speaking with someone regarding suicide, but wrote \"seeing someone for that. \" When asked if she contemplated such, she responded \"no\", but that she regulary sees a psychologist for depression. Current Deficits include: pain, edema, decreased mobility, decreased strength, and decreased postural awareness with resulting limitations in ADL's and in functional abilities.    Impairments are as follows:   Pain 2/10 but can increase to 9/10; +TTP BLE ITB insertions  Posture BLE genu valgum and external tibial torsion/genu recurvatum; pelvic crossed syndrome  Gait mild scissoring noted with decreased ALESSANDRA and increased lateral trunk propulsion  AROM/PROM   Knees: L 0-120/0-125 R 0-120/0-125  Strength: BLE hip extension 4-/5  BLE knee extension 4+/5 BLE knee flexion 4/5  BLE SLS HR 10x  Special Tests:  +BLE clarkes test and noble compression  BLE grossly intact to LT and proprioception  Fall Risk Scale:   TUG:  Time: 12sec  Findings: > or equal to 12sec is at risk for falls    5x sit-stand test:  Pt result: 20sec   Norms:  Age:  Time sec:   60-69  11.4  70-79  12.6  80-89  14.8  Predictions:  Falls>15sec  Vestibular >15sec  Parkinson's >16sec    REC 4sec REO 10sec    Functional Tests 75% bridge    Manual:  min   Rationale: provided to improve the patient's ability to     Modality (rationale):   []  E-Stim: type _ x _ min     []att   []unatt   []w/US   []w/ice   []w/heat  []  Traction: []cerv   []pelvic   _ lbs x _ min     []pro   []sup   []int   []const  []  Ultrasound: []cont   []pulse    _ W/cm2 x _  min   []1MHz   []3MHz  []  Iontophoresis: []take home patch w/ dexamethazone    []40mA   []80mA                               []_ mA min w/: []dexamethazone   []other:_  []  Ice pack _  min     [] Hot pack _  min     [] Paraffin _  min  []  Other:   Rationale: provided to improve the patient's ability to     Patient Education: [x] Established HEP    [x] POT (minutes) : 12min HEP reviewed and provided  Rationale: provided to improve the patient's ability to transfer and ambulate  theracts 11min safety techniques; max education on importance of balance training/strategies/fall prevention/pain relief techniques for knees  Rationale: provided to improve the patient's ability to ambulate  Pain Level (0-10 scale) post treatment: 2    ASSESSMENT  [x]  See Plan of Care    PLAN  [x]  Upgrade activities as tolerated     [x] Other:_    See POC for Frequency/duration of visits     Justification for Eval Code Complexity:   Patient History : see POC  Examination: (see exam)  Clinical Presentation: fluctuating  Clinical Decision Making : FOTO : 34 /100     G-Codes (GP):     Percy Morris, PT 7/25/2019  12:35 PM

## 2019-07-25 NOTE — PROGRESS NOTES
[facility] PHYSICAL THERAPY AT [address], [place] [address] [facility] [address] - Phone: [phone]  Fax: [phone] / [address] [facility]  Patient Name: Diamond León : 1965   Medical   Diagnosis: Gait instability [R26.81]  Bilateral knee pain [M25.561, M25.562] Treatment Diagnosis: Gait instability [R26.81]  Bilateral knee pain [M25.561, M25.562]   Onset Date: chronic     Referral Source: Lyle Shipley MD [facility]): 2019   Prior Hospitalization: See medical history Provider #: [id number]   Prior Level of Function: No pain squatting/sit-stands   Comorbidities: MS, depression, DM, fibromyalgia, OA, HTN, asthma, hearing impaired, BLE sciatica, Bilateral shoulder pain, left humeral ORIF x2, Fx of coccyx   Medications: Verified on Patient Summary List   The Plan of Care and following information is based on the information from the initial evaluation.   ===========================================================================================  Assessment / key information:  Patient is a 52 y.o. female who presents to [facility] with a diagnosis of Gait instability [R26.81]  Bilateral knee pain [M25.561, M25.562]. Patient is her own historian. Living situation is as follows: lives with spouse in [address] [facility] [address] with [address]. Occupation: NA.Pt presents with c/o bilateral localized anterior knee pain of several weeks duration of unknown cause. Pt reports \"clicking\" with walking and major contributing factor to increased pain is sit-stand transfers. Pain was described as localized \"burning\". Her prescription calls for gait training and balance training. Pt was mildly hesitant to partake in a balance evaluation, but after education on the importance of balance training, was open to such. Pt does not use an AD to ambulate. She owns a: SC, WC, RW, and a stairlift (which she does not use). Pt negotiates stairs with a step to pattern. She has a hx of multiple falls the most recent being three weeks ago. She denies any vertigo/vision issues. When asked about direction of falls, pt stated that most often they happen going to the left. Pt said she has difficulty getting off of the floor but is not interested in learning floor-mat transfers. She checked \"no\" on the intake form when asked about speaking with someone regarding suicide, but wrote \"seeing someone for that. \" When asked if she contemplated such, she responded \"no\", but that she regulary sees a psychologist for depression. Current Deficits include: pain, edema, decreased mobility, decreased strength, and decreased postural awareness with resulting limitations in ADL's and in functional abilities. Impairments are as follows:   Pain 2/10 but can increase to 9/10; +TTP BLE ITB insertions  Posture BLE genu valgum and external tibial torsion/genu recurvatum; pelvic crossed syndrome  Gait mild scissoring noted with decreased ALESSANDRA and increased lateral trunk propulsion  AROM/PROM   Knees: L 0-120/0-125 R 0-120/0-125  Strength: BLE hip extension 4-/5  BLE knee extension 4+/5 BLE knee flexion 4/5  BLE SLS HR 10x  Special Tests:  +BLE clarkes test and noble compression  BLE grossly intact to LT and proprioception  Fall Risk Scale:   TUG:  Time: 12sec  Findings: > or equal to 12sec is at risk for falls    5x sit-stand test:  Pt result: 20sec   Norms:  Age:  Time sec:   60-69  11.4  70-79  12.6  80-89  14.8  Predictions:  Falls>15sec  Vestibular >15sec  Parkinson's >16sec    REC 4sec REO 10sec    Functional Tests 75% bridge  Functional Deficits include: pain with sit-stands; poor balance. Patient's FOTO score was a 34/100 indicating decreased function.  Patient will benefit from a POC addressing such impairments and limitations in order to improve quality of life and return to PLOF.    ==================================================================================  Eval Complexity: History: HIGH Complexity :3+ comorbidities / personal factors will impact the outcome/ POC Exam:HIGH Complexity : 4+ Standardized tests and measures addressing body structure, function, activity limitation and / or participation in recreation  Presentation: MEDIUM Complexity : Evolving with changing characteristics  Clinical Decision Making:MEDIUM Complexity : FOTO score of 26-74Overall Complexity:MEDIUM  Problem List: pain affecting function, decrease ROM, decrease strength, edema affecting function, impaired gait/ balance, decrease ADL/ functional abilitiies, decrease activity tolerance, decrease flexibility/ joint mobility and decrease transfer abilities   Treatment Plan may include any combination of the following: Therapeutic exercise, Therapeutic activities, Neuromuscular re-education, Physical agent/modality, Gait/balance training, Manual therapy and Patient education  Patient / Family readiness to learn indicated by: asking questions  Persons(s) to be included in education: patient (P)  Barriers to Learning/Limitations: None  Measures taken: A HEP was initiated to assist with POC in restoring function; safety strategies; pain relief strategies   Patient Goal (s): Decrease pain    Patient self reported health status: fair  Rehabilitation Potential: excellent     Short Term Goals: To be accomplished in  2  treatments:  1. Pt will be compliant with HEP for symptom management at home.  Long Term Goals: To be accomplished in  10  treatments:  1. Pt will demonstrate an increased FOTO score to 44/100 in order to improve function  2. Pt will be independent with HEP at D/C for self management. 3. Pt will be able to perform TUG with LRAD for < 10sec in order to reduce risk of future falls  4. Pt will be able to perform 5 sit-stands without UE assist in < 11sec in order to reduce her risk of falls  5.  Pt will demonstrate REC for 30sec without LOB/fall in order to recure her future risk of falls    Frequency / Duration:   Patient to be seen  2  times per week for 10  treatments:  Patient / Caregiver education and instruction: exercises    Therapist Signature: Alicia García, KAI Date: 8/50/0375   Certification Period: 90 days   7/25/19-10/25/19 Time: 12:33 PM   ===========================================================================================  I certify that the above Physical Therapy Services are being furnished while the patient is under my care. I agree with the treatment plan and certify that this therapy is necessary. Physician Signature:        Date:       Time:     Please sign and return to [facility] or you may fax the signed copy to  [phone]. Thank you.

## 2019-07-30 ENCOUNTER — HOSPITAL ENCOUNTER (OUTPATIENT)
Dept: PHYSICAL THERAPY | Age: 54
Discharge: HOME | End: 2019-07-30
Payer: MEDICARE

## 2019-07-30 PROCEDURE — 97140 MANUAL THERAPY 1/> REGIONS: CPT

## 2019-07-30 PROCEDURE — 97110 THERAPEUTIC EXERCISES: CPT

## 2019-07-30 NOTE — PROGRESS NOTES
PHYSICAL THERAPY - DAILY TREATMENT NOTE    Patient Name: Nathaniel Hi        Date: 2019  : 1965   YES Patient  Verified  Visit #:      of   10  Insurance: Payor: Mira Frausto / Plan: [place] MEDICARE PART A & B / Product Type: Medicare /      In time: 3:50 Out time: 4:38   Total Treatment Time: 48     Medicare Time /BCBS Tracking (below)   Total Timed Codes (min):  38 1:1 Treatment Time:  38     TREATMENT AREA =  Gait instability [R26.81]  Bilateral knee pain [M25.561, M25.562]    SUBJECTIVE  Pain Level (on 0 to 10 scale):  3  / 10   Medication Changes/New allergies or changes in medical history, any new surgeries or procedures? NO    If yes, update Summary List   Subjective Functional Status/Changes:  []  No changes reported     Patient reports doing HEP without any problems. Had 2 falls this week. One where her foot  slipped going down the stairs and one getting out of bed. She did not hurt herself with either fall. .         OBJECTIVE  Modalities Rationale:     decrease inflammation and decrease pain to improve patient's ability to walk   min [] Estim, type/location:                                      []  att     []  unatt     []  w/US     []  w/ice    []  w/heat    min []  Mechanical Traction: type/lbs                   []  pro   []  sup   []  int   []  cont    []  before manual    []  after manual    min []  Ultrasound, settings/location:      min []  Iontophoresis w/ dexamethasone, location:                                               []  take home patch       []  in clinic   10 min [x]  Ice     []  Heat    location/position: bilateral knee in supine over wedge    min []  Vasopneumatic Device, press/temp:     min []  Other:    [x] Skin assessment post-treatment (if applicable):    [x]  intact    []  redness- no adverse reaction     []redness  adverse reaction:        28 min Therapeutic Exercise:  [x]  See flow sheet   Rationale:      increase strength to improve the patients ability to perform functional ADLs including sit to stand. 10 min Manual Therapy: STM to bilateral gluteus medius muscles. Rationale:      decrease pain and increase tissue extensibility to improve patient's ability to walk         min Patient Education:  YES  Reviewed HEP   []  Progressed/Changed HEP based on: Stand EO, stand wide based against wall EC. Effects of exercise on pain of OA. Other Objective/Functional Measures: Added bridging, side clams, recumbent bike and balance     Post Treatment Pain Level (on 0 to 10) scale:   0  / 10     ASSESSMENT  Assessment/Changes in Function:     Severely impaired use of sensory input and balance strategies with wide based gait. Able to perform all exercises without increased pain. []  See Progress Note/Recertification   Patient will continue to benefit from skilled PT services to modify and progress therapeutic interventions, address functional mobility deficits, address ROM deficits, address strength deficits, analyze and address soft tissue restrictions, analyze and modify body mechanics/ergonomics and instruct in home and community integration to attain remaining goals. Progress toward goals / Updated goals:    First visit since initial evaluation.      PLAN  [x]  Upgrade activities as tolerated YES Continue plan of care   []  Discharge due to :    []  Other:      Therapist: Angel Emerson, PT    Date: 7/30/2019 Time: 3:52 PM     Future Appointments   Date Time Provider Luis Romeo   8/2/2019  7:30 AM Amy Harris, PT [facility] [address]   8/6/2019  2:30 PM [address] PT [facility] 1 [facility] [address]   8/8/2019  8:00 AM [address] PT BHARATH 2 [facility] [address]   8/13/2019  1:30 PM [address] PT BH[facility] 1 [facility] [address]   8/15/2019  1:30 PM [address] PT [facility] 2 [facility] [address]   8/19/2019  1:30 PM [address] PT [facility] 2 [facility] [address]   8/20/2019  1:30 PM [address] PT BH[facility] 2 [facility] [address]

## 2019-08-02 ENCOUNTER — HOSPITAL ENCOUNTER (OUTPATIENT)
Dept: PHYSICAL THERAPY | Age: 54
Discharge: HOME | End: 2019-08-02
Payer: MEDICARE

## 2019-08-02 PROCEDURE — 97140 MANUAL THERAPY 1/> REGIONS: CPT

## 2019-08-02 PROCEDURE — 97110 THERAPEUTIC EXERCISES: CPT

## 2019-08-02 NOTE — PROGRESS NOTES
PHYSICAL THERAPY - DAILY TREATMENT NOTE    Patient Name: Aashish Wren        Date: 2019  : 1965   YES Patient  Verified  Visit #:   3   of   10  Insurance: Payor: Iker Lockhart / Plan: [place] MEDICARE PART A & B / Product Type: Medicare /      In time: 7:33 Out time: 8:17   Total Treatment Time: 42     Medicare Time /BCBS Tracking (below)   Total Timed Codes (min):  32 1:1 Treatment Time:  32     TREATMENT AREA =  Gait instability [R26.81]  Bilateral knee pain [M25.561, M25.562]    SUBJECTIVE  Pain Level (on 0 to 10 scale):  3   10   Medication Changes/New allergies or changes in medical history, any new surgeries or procedures? NO    If yes, update Summary List   Subjective Functional Status/Changes:  []  No changes reported     Pain increased in right knee to 4/10. OBJECTIVE  Modalities Rationale:     decrease inflammation and decrease pain to improve patient's ability to walk   min [] Estim, type/location:                                      []  att     []  unatt     []  w/US     []  w/ice    []  w/heat    min []  Mechanical Traction: type/lbs                   []  pro   []  sup   []  int   []  cont    []  before manual    []  after manual    min []  Ultrasound, settings/location:      min []  Iontophoresis w/ dexamethasone, location:                                               []  take home patch       []  in clinic   10 min [x]  Ice     []  Heat    location/position: bilateral knee in supine over wedge    min []  Vasopneumatic Device, press/temp:     min []  Other:    [x] Skin assessment post-treatment (if applicable):    [x]  intact    []  redness- no adverse reaction     []redness  adverse reaction:        22 min Therapeutic Exercise:  [x]  See flow sheet   Rationale:      increase strength to improve the patients ability to perform functional ADLs including sit to stand.      10 min Manual Therapy: STM to bilateral ITB   Rationale:      decrease pain and increase tissue extensibility to improve patient's ability to walk         min Patient Education:  YES  Reviewed HEP   []  Progressed/Changed HEP based on: Hold standing exercises. Issued HEP for isometrics, ball, band. Educated on joint protection with ADLs and exercises and use of ice for symptom reducation. Other Objective/Functional Measures:    Held standing exercises secondary to increased knee pain on the right. Added quad and hamstring sets, ankle pumps, hip add with ball, hip abd with TB     Post Treatment Pain Level (on 0 to 10) scale:   0  / 10     ASSESSMENT  Assessment/Changes in Function:     Increasing knee pain which may be due to exercises in weight bearing position. Decreased to isometrics for HEP. Good response to ice for pain reduction. []  See Progress Note/Recertification   Patient will continue to benefit from skilled PT services to modify and progress therapeutic interventions, address functional mobility deficits, address ROM deficits, address strength deficits, analyze and address soft tissue restrictions, analyze and modify body mechanics/ergonomics and instruct in home and community integration to attain remaining goals. Progress toward goals / Updated goals:    1. Pt will be compliant with HEP for symptom management at home. Progressing     · Long Term Goals: To be accomplished in  10  treatments:  1. Pt will demonstrate an increased FOTO score to 44/100 in order to improve function  2. Pt will be independent with HEP at D/C for self management. 3. Pt will be able to perform TUG with LRAD for < 10sec in order to reduce risk of future falls  4. Pt will be able to perform 5 sit-stands without UE assist in < 11sec in order to reduce her risk of falls  5.  Pt will demonstrate REC for 30sec without LOB/fall in order to recure her future risk of falls     PLAN  [x]  Upgrade activities as tolerated YES Continue plan of care   []  Discharge due to :    []  Other:      Therapist: Carlos Mcduffie, PT    Date: 8/2/2019 Time: 8:17 AM     Future Appointments   Date Time Provider Luis Agueda   8/6/2019  2:30 PM [facility] PT [facility] 1 [facility]   8/8/2019  8:00 AM [facility] PT [facility] 2 [facility]   8/13/2019  1:30 PM [facility] PT [facility] 1 [facility]   8/15/2019  1:30 PM [facility] PT [facility] 2 [facility]   8/19/2019  1:30 PM [facility] PT [facility] 2 [facility]   8/20/2019  1:30 PM [facility] PT BH[facility] 2 [facility]

## 2019-08-06 ENCOUNTER — HOSPITAL ENCOUNTER (OUTPATIENT)
Dept: PHYSICAL THERAPY | Age: 54
Discharge: HOME | End: 2019-08-06
Payer: MEDICARE

## 2019-08-06 PROCEDURE — 97110 THERAPEUTIC EXERCISES: CPT

## 2019-08-06 NOTE — PROGRESS NOTES
PHYSICAL THERAPY - DAILY TREATMENT NOTE    Patient Name: Charisse Cerda        Date: 2019  : 1965   YES Patient  Verified  Visit #:   4   of   10  Insurance: Payor: Rebekah Neighbours / Plan: [place] MED[place] PART [place] & B / Product Type: Medicare /      In time: 235 Out time: 310   Total Treatment Time: 35     Medicare Time /BCBS Tracking (below)   Total Timed Codes (min):  25 1:1 Treatment Time:  25     TREATMENT AREA =  Gait instability [R26.81]  Bilateral knee pain [M25.561, M25.562]    SUBJECTIVE  Pain Level (on 0 to 10 scale):  1  / 10   Medication Changes/New allergies or changes in medical history, any new surgeries or procedures? NO    If yes, update Summary List   Subjective Functional Status/Changes:  []  No changes reported     Pt reports that she has been doing well since the last visit, she has not had any falls and her knees are not as painful.            OBJECTIVE  Modalities Rationale:     decrease pain to improve patient's ability to walk   min [] Estim, type/location:                                      []  att     []  unatt     []  w/US     []  w/ice    []  w/heat    min []  Mechanical Traction: type/lbs                   []  pro   []  sup   []  int   []  cont    []  before manual    []  after manual    min []  Ultrasound, settings/location:      min []  Iontophoresis w/ dexamethasone, location:                                               []  take home patch       []  in clinic   10 min [x]  Ice     []  Heat    location/position: MARTITA knees in supine    min []  Vasopneumatic Device, press/temp:     min []  Other:    [] Skin assessment post-treatment (if applicable):    []  intact    []  redness- no adverse reaction     []redness  adverse reaction:        25 min Therapeutic Exercise:  [x]  See flow sheet   Rationale:      increase ROM, increase strength and improve balance to improve the patients ability to walk safely              min Patient Education:  YES  Reviewed HEP   [] Progressed/Changed HEP based on: Other Objective/Functional Measures:    New therex introduced today. Post Treatment Pain Level (on 0 to 10) scale:   1  / 10     ASSESSMENT  Assessment/Changes in Function:   Pt toelrated new exercises well today. She required vc and tc to improve posture with standing balance activities. She required min to mod vc for familiar exercises. She had no inc in pain with new activities. Manual therpay was deferred today as Pt reported no tenderness to palpation of IT bands. []  See Progress Note/Recertification   Patient will continue to benefit from skilled PT services to modify and progress therapeutic interventions, address functional mobility deficits, address ROM deficits, address strength deficits, analyze and address soft tissue restrictions, analyze and cue movement patterns, analyze and modify body mechanics/ergonomics, assess and modify postural abnormalities, address imbalance/dizziness and instruct in home and community integration to attain remaining goals. Progress toward goals / Updated goals: · Short Term Goals: To be accomplished in  2  treatments:  1. Pt will be compliant with HEP for symptom management at home.     · Long Term Goals: To be accomplished in  10  treatments:  1. Pt will demonstrate an increased FOTO score to 44/100 in order to improve function  2. Pt will be independent with HEP at D/C for self management. 3. Pt will be able to perform TUG with LRAD for < 10sec in order to reduce risk of future falls  4. Pt will be able to perform 5 sit-stands without UE assist in < 11sec in order to reduce her risk of falls  5.  Pt will demonstrate REC for 30sec without LOB/fall in order to recure her future risk of falls     PLAN  [x]  Upgrade activities as tolerated YES Continue plan of care   []  Discharge due to :    []  Other:      Therapist: Kendal Pickens    Date: 8/6/2019 Time: 2:49 PM     Future Appointments   Date Time Provider Department [place]   8/8/2019  8:00 AM [facility] PT [facility] 2 [facility]   8/13/2019  1:30 PM [facility] PT [facility] 1 [facility]   8/15/2019  1:30 PM [facility] PT [facility] 2 [facility]   8/19/2019  1:30 PM [facility] PT [facility] 2 [facility]   8/20/2019  1:30 PM [facility] PT [facility] 2 [facility]

## 2019-08-08 ENCOUNTER — HOSPITAL ENCOUNTER (OUTPATIENT)
Dept: PHYSICAL THERAPY | Age: 54
Discharge: HOME | End: 2019-08-08
Payer: MEDICARE

## 2019-08-08 PROCEDURE — 97110 THERAPEUTIC EXERCISES: CPT

## 2019-08-08 NOTE — PROGRESS NOTES
PHYSICAL THERAPY - DAILY TREATMENT NOTE    Patient Name: Jack Marie        Date: 2019  : 1965   YES Patient  Verified  Visit #:   5   of   10  Insurance: Payor: Rosendocriselda Mean / Plan: [place] MEDICARE PART A & B / Product Type: Medicare /      In time: 800 Out time: 849   Total Treatment Time: 52     Medicare Time /BCBS Tracking (below)   Total Timed Codes (min):  39 1:1 Treatment Time:  30     TREATMENT AREA =  Gait instability [R26.81]  Bilateral knee pain [M25.561, M25.562]    SUBJECTIVE  Pain Level (on 0 to 10 scale):  0.5  / 10   Medication Changes/New allergies or changes in medical history, any new surgeries or procedures? NO    If yes, update Summary List   Subjective Functional Status/Changes:  []  No changes reported     Pt reports feeling exhausted after last session. No muscle soreness to report. She would like to progress her supine hip ABD exercises as she doesn't feel adequately challenged. OBJECTIVE  Modalities Rationale:     decrease edema, decrease inflammation and decrease pain to improve patient's ability to walk and perform bending at knees during functional mobility.    min [] Estim, type/location:                                      []  att     []  unatt     []  w/US     []  w/ice    []  w/heat    min []  Mechanical Traction: type/lbs                   []  pro   []  sup   []  int   []  cont    []  before manual    []  after manual    min []  Ultrasound, settings/location:      min []  Iontophoresis w/ dexamethasone, location:                                               []  take home patch       []  in clinic   10 min [x]  Ice     []  Heat    location/position: Supine to B knee    min []  Vasopneumatic Device, press/temp:     min []  Other:    [x] Skin assessment post-treatment (if applicable):    [x]  intact    []  redness- no adverse reaction     []redness  adverse reaction:        39 min Therapeutic Exercise:  [x]  See flow sheet   Rationale:      increase ROM, increase strength, improve balance and increase proprioception to improve the patients ability to walk safely and perform functional mobility without inc pain/symptoms. min Patient Education:  Ciera Rose educated pt on proper progression protocols for inc challenge. Pt verbalized understanding. []  Progressed/Changed HEP based on: Other Objective/Functional Measures:    Hip ABD resistance band progressed today. Post Treatment Pain Level (on 0 to 10) scale:   0  / 10     ASSESSMENT  Assessment/Changes in Function:     Pt tolerated inc in red tband this date for challenge during supine hip ABD. Pt reported some inc in symptoms during mini squats, sx stopped after exercise was finished. VC given during standing mobility for neutral hip rotation to ensure proper muscle activation during sidestepping and mini squats. []  See Progress Note/Recertification   Patient will continue to benefit from skilled PT services to modify and progress therapeutic interventions, address functional mobility deficits, address ROM deficits, address strength deficits, analyze and cue movement patterns, analyze and modify body mechanics/ergonomics and address imbalance/dizziness to attain remaining goals. Progress toward goals / Updated goals: · Short Term Goals: To be accomplished in  2  treatments:  1. Pt will be compliant with HEP for symptom management at home. - achieved 8/8/19     · Long Term Goals: To be accomplished in  10  treatments:  1. Pt will demonstrate an increased FOTO score to 44/100 in order to improve function. - goal in progress  2.  Pt will be independent with HEP at D/C for self management. - goal in progress  3. Pt will be able to perform TUG with LRAD for < 10sec in order to reduce risk of future falls. - goal in progress  4. Pt will be able to perform 5 sit-stands without UE assist in < 11sec in order to reduce her risk of falls. - goal in progress  5. Pt will demonstrate REC for 30sec without LOB/fall in order to recure her future risk of falls. - goal in progress     PLAN  [x]  Upgrade activities as tolerated YES Continue plan of care   []  Discharge due to :    []  Other:      Therapist: Carmina Boggs, PT    Date: 8/8/2019 Time: 8:04 AM     Future Appointments   Date Time Provider [place]   8/13/2019  1:30 PM [facility] PT [address]   8/15/2019  1:30 PM [facility] PT [address]   8/19/2019  1:30 PM [facility] PT [address]   8/20/2019  1:30 PM [facility] PT [address]

## 2019-08-13 ENCOUNTER — HOSPITAL ENCOUNTER (OUTPATIENT)
Dept: PHYSICAL THERAPY | Age: 54
Discharge: HOME | End: 2019-08-13
Payer: MEDICARE

## 2019-08-13 PROCEDURE — 97110 THERAPEUTIC EXERCISES: CPT

## 2019-08-13 NOTE — PROGRESS NOTES
PHYSICAL THERAPY - DAILY TREATMENT NOTE    Patient Name: Jhonatan Lopez        Date: 2019  : 1965   YES Patient  Verified  Visit #:   6   of   10  Insurance: Payor: Ori Hernández / Plan: [place] MEDICARE PART A & B / Product Type: Medicare /      In time: 1:33 PM Out time: 2:22 PM   Total Treatment Time: 52     Medicare Time /BCBS Tracking (below)   Total Timed Codes (min):  39 1:1 Treatment Time:  39     TREATMENT AREA =  Gait instability [R26.81]  Bilateral knee pain [M25.561, M25.562]    SUBJECTIVE  Pain Level (on 0 to 10 scale): 1.5  / 10   Medication Changes/New allergies or changes in medical history, any new surgeries or procedures? NO    If yes, update Summary List   Subjective Functional Status/Changes:  []  No changes reported     Pt reports having in lat R hip into heel after last session. It did not last, but it affected her ability to walk. She also tried heat with her R knee and it helped greatly. She inquired about MHP use in PT.       OBJECTIVE  Modalities Rationale:     decrease pain and increase tissue extensibility to improve patient's ability to walk and perform bending at knees during functional mobility.    min [] Estim, type/location:                                      []  att     []  unatt     []  w/US     []  w/ice    []  w/heat    min []  Mechanical Traction: type/lbs                   []  pro   []  sup   []  int   []  cont    []  before manual    []  after manual    min []  Ultrasound, settings/location:      min []  Iontophoresis w/ dexamethasone, location:                                               []  take home patch       []  in clinic   10 min []  Ice     [x]  Heat    location/position: Supine to B knee    min []  Vasopneumatic Device, press/temp:     min []  Other:    [x] Skin assessment post-treatment (if applicable):    [x]  intact    []  redness- no adverse reaction     []redness  adverse reaction:        39 min Therapeutic Exercise:  [x]  See flow sheet Rationale:      increase ROM, increase strength, improve balance and increase proprioception to improve the patients ability to walk safely and perform functional mobility tasks without inc pain/symptoms. With TE min Patient Education:  Deloris Son, educated pt on avoidance of systemic heating 2/2 MS dx. Pt verbalized understanding. []  Progressed/Changed HEP based on: Other Objective/Functional Measures:    [facility] introduced this date for pain modulation. Pt deferred FOTO, wants to complete nv. Post Treatment Pain Level (on 0 to 10) scale:   1  / 10     ASSESSMENT  Assessment/Changes in Function:     No adverse effects noted with today's session. Mirror input provided to aid in proper mini squat form. []  See Progress Note/Recertification   Patient will continue to benefit from skilled PT services to modify and progress therapeutic interventions, address functional mobility deficits, address ROM deficits, address strength deficits, analyze and address soft tissue restrictions, analyze and cue movement patterns, analyze and modify body mechanics/ergonomics, assess and modify postural abnormalities, address imbalance/dizziness and instruct in home and community integration to attain remaining goals. Progress toward goals / Updated goals: · Short Term Goals: To be accomplished in  2  treatments:  1. Pt will be compliant with HEP for symptom management at home. - achieved 8/8/19     · Long Term Goals: To be accomplished in  10  treatments:  1. Pt will demonstrate an increased FOTO score to 44/100 in order to improve function. - goal in progress  2.  Pt will be independent with HEP at D/C for self management. - goal in progress  3. Pt will be able to perform TUG with LRAD for < 10sec in order to reduce risk of future falls. - goal in progress  4. Pt will be able to perform 5 sit-stands without UE assist in < 11sec in order to reduce her risk of falls. - goal in progress  5. Pt will demonstrate REC for 30sec without LOB/fall in order to recure her future risk of falls. - goal in progress     PLAN  [x]  Upgrade activities as tolerated YES Continue plan of care   []  Discharge due to :    []  Other:      Therapist: Rob Mayorga, PT    Date: 8/13/2019 Time: 1:39 PM     Future Appointments   Date Time Provider [place] Romeo   8/15/2019  1:30 PM Mook Hood PTA [place] [id number]   8/19/2019  1:30 PM [facility] [address] [facility] [place]   8/20/2019  1:30 PM [facility] PT [facility] 2 [facility]

## 2019-08-15 ENCOUNTER — HOSPITAL ENCOUNTER (OUTPATIENT)
Dept: PHYSICAL THERAPY | Age: 54
Discharge: HOME | End: 2019-08-15
Payer: MEDICARE

## 2019-08-15 PROCEDURE — 97110 THERAPEUTIC EXERCISES: CPT

## 2019-08-15 NOTE — PROGRESS NOTES
PHYSICAL THERAPY - DAILY TREATMENT NOTE    Patient Name: Moni Knee        Date: 8/15/2019  : 1965   YES Patient  Verified  Visit #:   7   of   10  Insurance: Payor: Kayla Loyd / Plan: [place] MEDICARE PART A & B / Product Type: Medicare /      In time: 1:45 pm Out time: 2:40 pm   Total Treatment Time: 55     Medicare Time /BCBS Tracking (below)   Total Timed Codes (min): 45 1:1 Treatment Time:  38     TREATMENT AREA =  Gait instability [R26.81]  Bilateral knee pain [M25.561, M25.562]    SUBJECTIVE  Pain Level (on 0 to 10 scale):  3 / 10   Medication Changes/New allergies or changes in medical history, any new surgeries or procedures? NO    If yes, update Summary List   Subjective Functional Status/Changes:  []  No changes reported     Pt reports no change in pain. Pain range: <1 to 8/10 . Pt reports its been where I couldn't walk on it.        OBJECTIVE  Modalities Rationale:     decrease edema, decrease inflammation, decrease pain and increase tissue extensibility to improve patient's ability to perform sit to stand and community walking    min [] Estim, type/location:                                      []  att     []  unatt     []  w/US     []  w/ice    []  w/heat    min []  Mechanical Traction: type/lbs                   []  pro   []  sup   []  int   []  cont    []  before manual    []  after manual    min []  Ultrasound, settings/location:      min []  Iontophoresis w/ dexamethasone, location:                                               []  take home patch       []  in clinic   10 min []  Ice     [x]  Heat    location/position: Supine bilateral knees    min []  Vasopneumatic Device, press/temp:     min []  Other:    [x] Skin assessment post-treatment (if applicable):    [x]  intact    []  redness- no adverse reaction     []redness  adverse reaction:        45 min Therapeutic Exercise:  [x]  See flow sheet   Rationale:      increase ROM and increase strength to improve the patients ability to perform sit to stand and community walking          With TE min Patient Education:  YES  Reviewed HEP   [x]  Progressed/Changed HEP based on:  Improving tolerance to exercise     Other Objective/Functional Measures: Add SAQ: 1# 10x 5 seconds, sit ham curls , ham curls with YTB     Post Treatment Pain Level (on 0 to 10) scale:  0 / 10     ASSESSMENT  Assessment/Changes in Function:     5 sit to stands: 23 seconds  TU seconds/16 seconds  Use of mirror for visual feedback in proper body mechanics and hip hinge in standing quarter squats. Pt demonstrating good eccentric quad control with use of UE for support. Slow progress toward all LTGs   []  See Progress Note/Recertification   Patient will continue to benefit from skilled PT services to modify and progress therapeutic interventions, address functional mobility deficits, address ROM deficits, address strength deficits, analyze and address soft tissue restrictions, analyze and cue movement patterns and instruct in home and community integration to attain remaining goals. Progress toward goals / Updated goals:  1. Pt will be compliant with HEP for symptom management at home. - achieved 19     · Long Term Goals: To be accomplished in  10  treatments:  1. Pt will demonstrate an increased FOTO score to 44/100 in order to improve function. - goal in progress  2.  Pt will be independent with HEP at D/C for self management. - goal in progress  3. Pt will be able to perform TUG with LRAD for < 10sec in order to reduce risk of future falls. - goal in progress  4. Pt will be able to perform 5 sit-stands without UE assist in < 11sec in order to reduce her risk of falls. - goal in progress  5. Pt will demonstrate REC for 30sec without LOB/fall in order to recure her future risk of falls. - goal in progress     PLAN  []  Upgrade activities as tolerated YES Continue plan of care   []  Discharge due to :    []  Other:      Therapist: Colleen Perez PTA    Date: 8/15/2019 Time: 2:40  PM     Future Appointments   Date Time Provider Luis Romeo   8/19/2019  1:30 PM [facility] PT [facility] 2 [facility]   8/20/2019  1:30 PM [facility] PT [facility] 2 [facility]

## 2019-08-19 ENCOUNTER — HOSPITAL ENCOUNTER (OUTPATIENT)
Dept: PHYSICAL THERAPY | Age: 54
End: 2019-08-19
Payer: MEDICARE

## 2019-08-20 ENCOUNTER — HOSPITAL ENCOUNTER (OUTPATIENT)
Dept: PHYSICAL THERAPY | Age: 54
Discharge: HOME | End: 2019-08-20
Payer: MEDICARE

## 2019-08-20 PROCEDURE — 97110 THERAPEUTIC EXERCISES: CPT

## 2019-08-20 PROCEDURE — 97140 MANUAL THERAPY 1/> REGIONS: CPT

## 2019-08-20 NOTE — PROGRESS NOTES
PHYSICAL THERAPY - DAILY TREATMENT NOTE    Patient Name: Ge Arthur        Date: 2019  : 1965   YES Patient  Verified  Visit #:   8   of   10  Insurance: Payor: Ana Paula Torres / Plan: [place] MEDICARE PART A & B / Product Type: Medicare /      In time: 1:30 Out time: 210   Total Treatment Time: 40     Medicare Time /BCBS Tracking (below)   Total Timed Codes (min): 40 1:1 Treatment Time:  40     TREATMENT AREA =  Gait instability [R26.81]  Bilateral knee pain [M25.561, M25.562]    SUBJECTIVE  Pain Level (on 0 to 10 scale):  2 / 10   Medication Changes/New allergies or changes in medical history, any new surgeries or procedures? NO    If yes, update Summary List   Subjective Functional Status/Changes:  []  No changes reported   Pt uncertain if therapy is addressing her knee pain. Pt was educated on importance of strengthening and stabilization to assist with transfers/ambulation.        OBJECTIVE  Modalities Rationale:     decrease edema, decrease inflammation, decrease pain and increase tissue extensibility to improve patient's ability to perform sit to stand and community walking    min [] Estim, type/location:                                      []  att     []  unatt     []  w/US     []  w/ice    []  w/heat    min []  Mechanical Traction: type/lbs                   []  pro   []  sup   []  int   []  cont    []  before manual    []  after manual    min []  Ultrasound, settings/location:      min []  Iontophoresis w/ dexamethasone, location:                                               []  take home patch       []  in clinic    min []  Ice     [x]  Heat    location/position: Supine bilateral knees    min []  Vasopneumatic Device, press/temp:     min []  Other:    [x] Skin assessment post-treatment (if applicable):    [x]  intact    []  redness- no adverse reaction     []redness  adverse reaction:        32 min Therapeutic Exercise:  [x]  See flow sheet   Rationale:      increase ROM and increase strength to improve the patients ability to  perform sit to stand and community walking      8 min Manual Therapy: BLE PF glides medial and caudal grade III; stick roll to bilateral ITB   Rationale:      decrease pain and increase ROM to improve patient's ability to transfers    With TE min Patient Education:  YES  Reviewed HEP   [x]  Progressed/Changed HEP based on:  Improving tolerance to exercise     Other Objective/Functional Measures: Added PF glides and ITB MF stretching with stick roll  Added BOSU bridging  Added TKE with hip adduction 5sec x 10  Added prone quad stretches to tolerance   Post Treatment Pain Level (on 0 to 10) scale:  0 / 10     ASSESSMENT  Assessment/Changes in Function:   Pt able to complete all therex this session and challenged significantly with standing balance therex on foam and REC. []  See Progress Note/Recertification   Patient will continue to benefit from skilled PT services to modify and progress therapeutic interventions, address functional mobility deficits, address ROM deficits, address strength deficits, analyze and address soft tissue restrictions, analyze and cue movement patterns and instruct in home and community integration to attain remaining goals. Progress toward goals / Updated goals:  1. Pt will be compliant with HEP for symptom management at home. - achieved 8/8/19     · Long Term Goals: To be accomplished in  10  treatments:  1. Pt will demonstrate an increased FOTO score to 44/100 in order to improve function. - goal in progress  2. Pt will be independent with HEP at D/C for self management. - goal in progress  3. Pt will be able to perform TUG with LRAD for < 10sec in order to reduce risk of future falls. - goal in progress  4. Pt will be able to perform 5 sit-stands without UE assist in < 11sec in order to reduce her risk of falls. - goal in progress  5. Pt will demonstrate REC for 30sec without LO B/fall in order to recure her future risk of falls.  - PROGRESSING 8/20/19 moderate sway to the left     PLAN  []  Upgrade activities as tolerated YES Continue plan of care   []  Discharge due to :    []  Other:      Therapist: Bert Cobos PT    Date: 8/20/2019 Time: 2:40  PM     Future Appointments   Date Time Provider Luis Romeo   8/23/2019 10:30 AM [facility] PT [facility] 2 [facility]

## 2019-08-23 ENCOUNTER — HOSPITAL ENCOUNTER (OUTPATIENT)
Dept: PHYSICAL THERAPY | Age: 54
Discharge: HOME | End: 2019-08-23
Payer: MEDICARE

## 2019-08-23 PROCEDURE — 97110 THERAPEUTIC EXERCISES: CPT

## 2019-08-23 NOTE — PROGRESS NOTES
PHYSICAL THERAPY - DAILY TREATMENT NOTE    Patient Name: Amanuel Wihte        Date: 2019  : 1965   YES Patient  Verified  Visit #:   9   of   10  Insurance: Payor: Keith Small / Plan: [place] MED[place] PART A & B / Product Type: Medicare /      In time: 9 Out time: 1100   Total Treatment Time: 22     Medicare Time /BCBS Tracking (below)   Total Timed Codes (min):  22 1:1 Treatment Time:  22     TREATMENT AREA =  Gait instability [R26.81]  Bilateral knee pain [M25.561, M25.562]    SUBJECTIVE  Pain Level (on 0 to 10 scale):  2  / 10   Medication Changes/New allergies or changes in medical history, any new surgeries or procedures? NO    If yes, update Summary List   Subjective Functional Status/Changes:  []  No changes reported     Pt reports that she is about 50% better than when she started, she feels she has gotten enough benefit from PT and would like today to be her last day          OBJECTIVE  Modalities Rationale:    NA pt declined    22 min Therapeutic Exercise:  [x]  See flow sheet   Rationale:      increase ROM and increase strength to improve the patients ability to walk, squat, and transfer        min Patient Education:  YES  Reviewed HEP   []  Progressed/Changed HEP based on: Other Objective/Functional Measures:    Pt issued updated HEP, see chart for copy     Post Treatment Pain Level (on 0 to 10) scale:   2  / 10     ASSESSMENT  Assessment/Changes in Function:     Pt required min vc for familiar exercises, she was given an updated HEP and instructed on how to modify and progress exercises at home. Pt verbalized understanding. Pt will DC today, see note for details.       []  See Progress Note/Recertification   Patient will continue to benefit from skilled PT services to modify and progress therapeutic interventions, address functional mobility deficits, address ROM deficits, address strength deficits, analyze and address soft tissue restrictions, analyze and cue movement patterns, analyze and modify body mechanics/ergonomics, assess and modify postural abnormalities, address imbalance/dizziness and instruct in home and community integration to attain remaining goals. Progress toward goals / Updated goals:    1. Pt will be compliant with HEP for symptom management at home. - achieved 8/8/19     · Long Term Goals: To be accomplished in  10  treatments:  1. Pt will demonstrate an increased FOTO score to 44/100 in order to improve function. - goal in progress  2.  Pt will be independent with HEP at D/C for self management. - goal in progress  3. Pt will be able to perform TUG with LRAD for < 10sec in order to reduce risk of future falls. - goal in progress  4. Pt will be able to perform 5 sit-stands without UE assist in < 11sec in order to reduce her risk of falls. - goal in progress  5. Pt will demonstrate REC for 30sec without LO B/fall in order to recure her future risk of falls. - PROGRESSING 8/20/19 moderate sway to the left     PLAN  []  Upgrade activities as tolerated No Continue plan of care   [x]  Discharge due to : Pt elected to DC today   []  Other:      Therapist: Mayela Chong    Date: 8/23/2019 Time: 9:25 AM     Future Appointments   Date Time Provider Luis Romeo   8/23/2019 10:30 AM [address] [facility] [address] [facility]

## 2019-08-23 NOTE — PROGRESS NOTES
[address] [place] [address] PHYSICAL THERAPY  [address] [facility], [facility], [address] - Phone: [phone]  Fax: [phone]:        [x]  PHYSICAL THERAPY       []  OCCUPATIONAL THERAPY       Patient Name: Rob Luis : 1965   Treatment Diagnosis: Gait instability [R26.81]  Bilateral knee pain [M25.561, M25.562] Onset Date: 2019   Referral Source: Lena Bhandari MD [place] [facility]): 2019   Prior Hospitalization: See medical hx Provider #: [id number]   Prior Level of Function: No pain squatting/sit to stands   Comorbidities: MS, depression, DM, fibromyalgia, OA, HTN, asthma, hearing impaired, BLE sciatica, Bilateral shoulder pain, left humeral ORIF x2, Fx of coccyx   Visits from [facility]: 9 Missed Visits: 1     Goal/Measure of Progress Goal Met? 1. Pt will be able to perform 5 sit-stands without UE assist in < 11sec in order to reduce her risk of falls   Status at last Eval: Relies on UE assist Current Status: 23 seconds with no UE assist Progressing   2. Pt will be able to perform TUG with LRAD for < 10sec in order to reduce risk of future falls   Status at last Eval: 12s average Current Status: 15s average regressed   3. Pt will demonstrate an increased FOTO score to 44/100 in order to improve function. Status at last Eval: 34 Current Status: 30 Regressed     Key Functional Changes/Progress: Pt reports that she feels about 50% improvement in function in spite of regressed FOTO score. Although Pt time with sit to stands is prolonged, she is now able to perform the activity with no UE assist, and only mild knee pain in comparison to at eval. This evidencing Pt improving strength and AROM which will likely continue to contribute to improved function with adherence to HEP. Pt was issued updated HEP, and is I c the HEP and ready to DC today.  Pt is choosing to self DC as she feels she has reached the most benefit she can from PT. Assessments/Recommendations: Discontinue therapy. Progressing towards or have reached established goals. Specifics: Pt elected to DC from therapy today at I c HEP. If you have any questions/comments please contact us directly at [phone]. Thank you for allowing us to assist in the care of your patient. Therapist Signature: Sofiya Whitlock Date: 8/23/2019   Reporting Period: 7/25/19-10/25/19 Time: 10:45 AM   NOTE TO PHYSICIAN:  PLEASE COMPLETE THE ORDERS BELOW AND FAX TO   [facility]: [phone]  If you are unable to process this request in 24 hours please contact our office: [phone]    ___ I have read the above report and request that my patient continue as recommended.   ___ I have read the above report and request that my patient continue therapy with the following changes/special instructions:_________________________________________________________   ___ I have read the above report and request that my patient be discharged from therapy.      Physician Signature:        Date:       Time:

## 2020-02-12 ENCOUNTER — APPOINTMENT (OUTPATIENT)
Dept: PHYSICAL THERAPY | Age: 55
End: 2020-02-12
Payer: MEDICARE

## 2020-02-13 ENCOUNTER — HOSPITAL ENCOUNTER (OUTPATIENT)
Dept: PHYSICAL THERAPY | Age: 55
Discharge: HOME | End: 2020-02-13
Payer: MEDICARE

## 2020-02-13 PROCEDURE — 97162 PT EVAL MOD COMPLEX 30 MIN: CPT

## 2020-02-13 PROCEDURE — 97110 THERAPEUTIC EXERCISES: CPT

## 2020-02-13 NOTE — PROGRESS NOTES
PHYSICAL THERAPY - DAILY TREATMENT NOTE    Patient Name: Noah Whyte        Date: 2020  : 1965   yes Patient  Verified  Visit #:   1   of   10  Insurance: Payor: Alice Beans / Plan: [place] MEDICARE PART A & B / Product Type: Medicare /      In time: 240 Out time: 320   Total Treatment Time: 40     Medicare/BCBS Time Tracking (below)   Total Timed Codes (min):  8 1:1 Treatment Time:  40     TREATMENT AREA =  Bilateral knee pain [M25.561, M25.562]    SUBJECTIVE  Pain Level (on 0 to 10 scale):   10   Medication Changes/New allergies or changes in medical history, any new surgeries or procedures?    no  If yes, update Summary List   Subjective Functional Status/Changes:  []  No changes reported   See Eval for subjective information and c/o. OBJECTIVE  8 min Therapeutic Exercise:  [x]  See flow sheet HEP reviewed; Kinesiotape- space correction technique applied to the medial/lateral left knee . Patient was educated on Kinesiotape precautions, indications, contraindications, and instructed to remove if irritation/rash/redness/pain increases or develops. Patient with verbalized understanding of all. Rationale:      increase ROM, increase strength, increase proprioception and ambulate post session to improve the patients ability to ambulate post session     Billed With/As:   [x] TE   [] TA   [] Neuro   [] Self Care Patient Education: [x] Review HEP    [] Progressed/Changed HEP based on:   [x] positioning   [x] body mechanics   [] transfers   [x] heat/ice application    [] other:      Other Objective/Functional Measures:    See Eval     Post Treatment Pain Level (on 0 to 10) scale:    10     ASSESSMENT  Assessment/Changes in Function:     See Eval     []  See Progress Note/Recertification   Patient will continue to benefit from skilled PT services to modify and progress therapeutic interventions to attain remaining goals.    Progress toward goals / Updated goals:  Pt denied sharp pain or red flags with initial eval or therapeutic ex.  See initial eval.      PLAN  []  Upgrade activities as tolerated yes Continue plan of care   []  Discharge due to :    []  Other:      Therapist: Earline Lundborg, PT    Date: 2/13/2020 Time: 1:22 PM     Future Appointments   Date Time Provider Luis Romeo   2/13/2020  2:30 PM [facility] PT BHARATH 1 [facility]CPTA [facility]

## 2020-02-13 NOTE — PROGRESS NOTES
[address] PHYSICAL THERAPY AT [address], [facility], [place] [address] - Phone: [phone]  Fax: [phone] / [address] [place] [address] PHYSICAL THERAPY SERVICES  Patient Name: Genesis Julio : 1965   Medical   Diagnosis: Bilateral knee pain [M25.561, M25.562] Treatment Diagnosis: Bilateral knee pain [M25.561, M25.562]   Onset Date: chronic     Referral Source: Viridiana Hector MD [facility] [place]): 2020   Prior Hospitalization: See medical history Provider #: [id number]   Prior Level of Function: Pain, but less pain with ambulation and stair negotiation   Comorbidities: MS, DM, fibromyalgia, depression, hearing impaired, asthma, HTN   Medications: Verified on Patient Summary List   The Plan of Care and following information is based on the information from the initial evaluation.   ===========================================================================================  Assessment / key information:  Patient is a 62 y.o. female who presents to [facility] with a diagnosis of Bilateral knee pain [M25.561, M25.562]. Patient is her own historian. Living situation is as follows: 2 story [facility] 30 with stairlift. Pt presents with c/o bilateral knee pain L>R which is chronic in nature and for which she was seen last summer for. Pt reports a relief in knee pain with therapy in the summer 2019, but has not continued with her HEP as instructed and pain gradually worsened. Pt reports receiving an injection in the left knee 3 weeks ago with no relief of pain. Pain is localized to the medial left knee. Currently, only minimal discomfort in the right. She conitnues to ambulate with her SC and negotiates her stairs at home with a step to gait. No LOB/falls. Pt c/o popping and difficulty moving the left knee into both flexion and extension.  Any WBing activity aggravates both knees and pain is decreased with ice and rest although she is not icing the knees at home currently. She does not use a knee brace/neoprene sleeve at this time and may be interested in such for added stability after being educated in indications of such. Current Deficits include: pain, edema, decreased mobility, decreased strength, and decreased postural awareness with resulting limitations in ADL's and in functional abilities. Patient will benefit from a comprehensive POC/HEP to address impairments and restore function in order to return to prior level of function and prevent secondary impairments. Impairments are as follows:   Pain: current pain level4/10, pain level at worst5/10, and pain level at best2/10; +TTP left pes anserine, MCL, medial joint line  Posture BLE genu valgum and pes planus   Gait antalgic with large ALESSANDRA and SC with trendelenburg gait  AROM/PROM L knee: 0-111/0-120 R knee 0-130/0-135  Strength knee extension L 4- pain R 4   Knee flexion L 4- pain R 4  Special Tests   REO with moderate sway  + left and right Clarkes test  + left medial mcmurrays test   Functional Tests   Unable to squat  Functional Deficits include: see above. Patient's FOTO score was a 45/100 indicating decreased function.  Patient will benefit from a POC addressing such impairments and limitations in order to improve quality of life and return to PLOF.    ==================================================================================  Eval Complexity: History: HIGH Complexity :3+ comorbidities / personal factors will impact the outcome/ POC Exam:HIGH Complexity : 4+ Standardized tests and measures addressing body structure, function, activity limitation and / or participation in recreation  Presentation: MEDIUM Complexity : Evolving with changing characteristics  Clinical Decision Making:MEDIUM Complexity : FOTO score of 26-74Overall Complexity:MEDIUM  Problem List: pain affecting function, decrease ROM, decrease strength, impaired gait/ balance, decrease ADL/ functional abilitiies, decrease activity tolerance, decrease flexibility/ joint mobility and decrease transfer abilities   Treatment Plan may include any combination of the following: Therapeutic exercise, Therapeutic activities, Neuromuscular re-education, Physical agent/modality, Gait/balance training, Manual therapy, Patient education, Self Care training, Functional mobility training, Home safety training and Stair training  Patient / Family readiness to learn indicated by: asking questions  Persons(s) to be included in education: patient (P)  Barriers to Learning/Limitations: None  Measures taken: A HEP was initiated to assist with POC in restoring function; KT   Patient Goal (s): Decreased pain   Patient self reported health status: good  Rehabilitation Potential: good     Short Term Goals: To be accomplished in  2  treatments:  1. Pt will be compliant with HEP for symptom management at home.  Long Term Goals: To be accomplished in  10  treatments:  1. Pt will demonstrate an increased FOTO score to 50/100 in order to improve function  2. Pt will be independent with HEP at D/C for self management. 3. Pt will demonstrate pain free AROM of left knee to 0-130 in order to more easily negotiate her stairs at home  4. Pt will demonstrate bilateral knee strength into flexion and extension to > 4+/5 in order to assist with transfers and ambulate with decreased pain and decreased possibility of buckling  5.  Pt will be able to perform REC on level surface for 30sec without sway in order to decrease risk of falls    Frequency / Duration:   Patient to be seen  2  times per week for 10  treatments:  Patient / Caregiver education and instruction: exercises and other KT    Therapist Signature: Dali Shearer PT Date: 1/13/4385   Certification Period: 90 days  2/13/20-5/11/20 Time: 1:21 PM   ===========================================================================================  I certify that the above Physical Therapy Services are being furnished while the patient is under my care. I agree with the treatment plan and certify that this therapy is necessary. Physician Signature:        Date:       Time:     Please sign and return to [facility] or you may fax the signed copy to  [phone]. Thank you.

## 2020-02-17 ENCOUNTER — APPOINTMENT (OUTPATIENT)
Dept: PHYSICAL THERAPY | Age: 55
End: 2020-02-17
Payer: MEDICARE

## 2020-02-20 ENCOUNTER — HOSPITAL ENCOUNTER (OUTPATIENT)
Dept: PHYSICAL THERAPY | Age: 55
Discharge: HOME | End: 2020-02-20
Payer: MEDICARE

## 2020-02-20 PROCEDURE — 97110 THERAPEUTIC EXERCISES: CPT

## 2020-02-20 NOTE — PROGRESS NOTES
PHYSICAL THERAPY - DAILY TREATMENT NOTE    Patient Name: Eva Spears        Date: 2020  : 1965   YES Patient  Verified  Visit #:   2   of   10  Insurance: Payor: [place] MEDICARE / Plan: VA MEDICARE PART A & B / Product Type: Medicare /      In time: 2:30 PM Out time: 2:55 PM   Total Treatment Time: 25     Medicare Time /BCBS Tracking (below)   Total Timed Codes (min):  25 1:1 Treatment Time:  25     TREATMENT AREA =  Bilateral knee pain [M25.561, M25.562]    SUBJECTIVE  Pain Level (on 0 to 10 scale):  2  / 10   Medication Changes/New allergies or changes in medical history, any new surgeries or procedures? NO    If yes, update Summary List   Subjective Functional Status/Changes:  []  No changes reported     Pt reports having some itchiness and redness where her adhesive was last session. OBJECTIVE    25 min Therapeutic Exercise:  [x]  See flow sheet   Rationale:      increase ROM and increase strength to improve the patients ability to perform LE functional tasks and ADL     With TE min Patient Education:  YES  Reviewed HEP   []  Progressed/Changed HEP based on: Other Objective/Functional Measures: Added exercises per flow sheet this date     Post Treatment Pain Level (on 0 to 10) scale:   2.5-3  / 10     ASSESSMENT  Assessment/Changes in Function:     Pt reported slight inc in knee pain this date, deferred cold modality when offered. Pt instructed to use ice pack at home if needed. Pt verbalized understanding. Some inc in knee pain noted with sidestepping exercise, resolved once exercise was over. No residual rash noted when observed.      []  See Progress Note/Recertification   Patient will continue to benefit from skilled PT services to modify and progress therapeutic interventions, address functional mobility deficits, address ROM deficits, address strength deficits, analyze and address soft tissue restrictions, analyze and cue movement patterns, analyze and modify body mechanics/ergonomics, assess and modify postural abnormalities and address imbalance/dizziness to attain remaining goals. Progress toward goals / Updated goals: · Short Term Goals: To be accomplished in  2  treatments:  1. Pt will be compliant with HEP for symptom management at home.     · Long Term Goals: To be accomplished in  10  treatments:  1. Pt will demonstrate an increased FOTO score to 50/100 in order to improve function  2. Pt will be independent with HEP at D/C for self management. 3. Pt will demonstrate pain free AROM of left knee to 0-130 in order to more easily negotiate her stairs at home  4. Pt will demonstrate bilateral knee strength into flexion and extension to > 4+/5 in order to assist with transfers and ambulate with decreased pain and decreased possibility of buckling  5.  Pt will be able to perform REC on level surface for 30sec without sway in order to decrease risk of falls        PLAN  [x]  Upgrade activities as tolerated YES Continue plan of care   []  Discharge due to :    []  Other:      Therapist: Rubina Blair PT    Date: 2/20/2020 Time: 3:04 PM     Future Appointments   Date Time Provider Luis Romeo   2/25/2020  1:30 PM [facility]   2/27/2020  3:00 PM [facility]   3/3/2020  1:30 PM [facility]   3/5/2020  3:00 PM [facility]   3/10/2020  2:30 PM [facility]   3/12/2020  3:00 PM [facility] PT BHARATH 1 KAR[facility]TA [facility]

## 2020-02-27 ENCOUNTER — HOSPITAL ENCOUNTER (OUTPATIENT)
Dept: PHYSICAL THERAPY | Age: 55
Discharge: HOME | End: 2020-02-27
Payer: MEDICARE

## 2020-02-27 PROCEDURE — 97110 THERAPEUTIC EXERCISES: CPT

## 2020-02-27 NOTE — PROGRESS NOTES
PHYSICAL THERAPY - DAILY TREATMENT NOTE    Patient Name: Mariusz Espinoza        Date: 2020  : 1965   YES Patient  Verified  Visit #:   3   of   10  Insurance: Payor: Maricel Woodson / Plan: VA MEDICARE PART A & B / Product Type: Medicare /      In time: 3:08 PM Out time: 3:55 PM   Total Treatment Time: 52     Medicare Time /BCBS Tracking (below)   Total Timed Codes (min):  37 1:1 Treatment Time:  37     TREATMENT AREA =  Bilateral knee pain [M25.561, M25.562]    SUBJECTIVE  Pain Level (on 0 to 10 scale):  2  / 10   Medication Changes/New allergies or changes in medical history, any new surgeries or procedures? NO    If yes, update Summary List   Subjective Functional Status/Changes:  []  No changes reported     Pt reports that she will be calling to schedule an MRI in the next few days. She was not told to discontinue therapy in the meantime.        OBJECTIVE  Modalities Rationale:     decrease edema, decrease inflammation and decrease pain to improve patient's ability to perform LE functional tasks and ADL   min [] Estim, type/location:                                      []  att     []  unatt     []  w/US     []  w/ice    []  w/heat    min []  Mechanical Traction: type/lbs                   []  pro   []  sup   []  int   []  cont    []  before manual    []  after manual    min []  Ultrasound, settings/location:      min []  Iontophoresis w/ dexamethasone, location:                                               []  take home patch       []  in clinic   10 min [x]  Ice     []  Heat    location/position: To L knee s/p session, supine    min []  Vasopneumatic Device, press/temp:     min []  Other:    [x] Skin assessment post-treatment (if applicable):    [x]  intact    []  redness- no adverse reaction     []redness  adverse reaction:        37 min Therapeutic Exercise:  [x]  See flow sheet   Rationale:      increase ROM, increase strength, improve coordination, improve balance and increase proprioception to improve the patients ability to perform LE functional tasks and ADL    With TE min Patient Education:  YES  Reviewed HEP   []  Progressed/Changed HEP based on: Other Objective/Functional Measures: Added ROM exercises, S/L hip ABD, clamshells this date   Post Treatment Pain Level (on 0 to 10) scale:   0.5  / 10     ASSESSMENT  Assessment/Changes in Function:     Pt tolerated additions with some inc in L knee pain. CP offered for symptom control. Pt educated regarding clinical reasoning behind balance exercises to assist in LE function. Pt verbalized understanding. Inc postural sway noted during ROM exercise, though no loss of balance appreciated. []  See Progress Note/Recertification   Patient will continue to benefit from skilled PT services to modify and progress therapeutic interventions, address functional mobility deficits, address ROM deficits, address strength deficits, analyze and address soft tissue restrictions, analyze and cue movement patterns, analyze and modify body mechanics/ergonomics, assess and modify postural abnormalities and address imbalance/dizziness to attain remaining goals. Progress toward goals / Updated goals: · Short Term Goals: To be accomplished in  2  treatments:  1. Pt will be compliant with HEP for symptom management at home.     · Long Term Goals: To be accomplished in  10  treatments:  1. Pt will demonstrate an increased FOTO score to 50/100 in order to improve function  2. Pt will be independent with HEP at D/C for self management.   3. Pt will demonstrate pain free AROM of left knee to 0-130 in order to more easily negotiate her stairs at home  4. Pt will demonstrate bilateral knee strength into flexion and extension to > 4+/5 in order to assist with transfers and ambulate with decreased pain and decreased possibility of buckling  5. Pt will be able to perform REC on level surface for 30sec without sway in order to decrease risk of falls        PLAN  [x] Upgrade activities as tolerated YES Continue plan of care   []  Discharge due to :    []  Other:      Therapist: Arelis Rasheed PT    Date: 2/27/2020 Time: 3:46 PM     Future Appointments   Date Time Provider Luis Romeo   3/3/2020  1:30 PM Elham Dowling, PT [facility]   3/5/2020  3:00 PM Elham Dowling, PT [facility]   3/10/2020  2:30 PM Elham Dowling, PT [facility]   3/12/2020  3:00 PM [facility] PT BHARATH 1 [facility]

## 2020-03-05 ENCOUNTER — APPOINTMENT (OUTPATIENT)
Dept: PHYSICAL THERAPY | Age: 55
End: 2020-03-05

## 2020-03-10 ENCOUNTER — APPOINTMENT (OUTPATIENT)
Dept: PHYSICAL THERAPY | Age: 55
End: 2020-03-10

## 2020-03-12 ENCOUNTER — APPOINTMENT (OUTPATIENT)
Dept: PHYSICAL THERAPY | Age: 55
End: 2020-03-12

## 2020-04-03 ENCOUNTER — TELEPHONE (OUTPATIENT)
Dept: PHYSICAL THERAPY | Age: 55
End: 2020-04-03

## 2020-04-03 ENCOUNTER — DOCUMENTATION ONLY (OUTPATIENT)
Dept: PHYSICAL THERAPY | Age: 55
End: 2020-04-03

## 2020-04-16 NOTE — PROGRESS NOTES
30 [facility] PHYSICAL THERAPY AT [address] [facility]  [address] [facility] [place] [address]  Phone: [phone] Fax: [phone]    [facility] PHYSICAL THERAPY          Patient Name: Yasmine Long : 1965   Treatment/Medical Diagnosis: Bilateral knee pain [M25.561, M25.562]   Onset Date: Chronic    Referral Source: Ran Valdivia MD [facility]): 20   Prior Hospitalization: See Medical History Provider #: [id number]   Prior Level of Function: Pain, but less pain with ambulation and stair negotiation   Comorbidities: MS, DM, fibromyalgia, depression, hearing impaired, asthma, HTN   Medications: Verified on Patient Summary List   Visits from [facility]: 20 Missed Visits: 3     Goal/Measure of Progress Goal Met? 1. Pt will be compliant with HEP for symptom management at home. Status at Last Eval: New goal Current Status: Unable to formally assess no     Key Functional Changes/Progress: Pt did not return after 20 appt, requests self-hold in light of pending MRI results. Pt to be DC'd at this time. Assessments/Recommendations: Discontinue therapy due to lack of attendance or compliance. If you have any questions/comments please contact us directly at [phone]  Thank you for allowing us to assist in the care of your patient.     Therapist Signature: Marline Marroquin PT Date: 20   Reporting Period: 20 to 20 Time: 10:18 AM

## (undated) DEVICE — SUTURE MCRYL SZ 4-0 L18IN ABSRB UD L19MM PS-2 3/8 CIR PRIM Y496G

## (undated) DEVICE — 1010 S-DRAPE TOWEL DRAPE 10/BX: Brand: STERI-DRAPE™

## (undated) DEVICE — BANDAGE COMPR EXSANGUATION SGL LAYERED NO CLSR 9FT LEN 4IN W

## (undated) DEVICE — COTTON UNDERCAST PADDING,REGULAR FINISH: Brand: WEBRIL

## (undated) DEVICE — (D)PREP SKN CHLRAPRP APPL 26ML -- CONVERT TO ITEM 371833

## (undated) DEVICE — PREP SKN CHLRAPRP 26ML TNT -- CONVERT TO ITEM 373320

## (undated) DEVICE — BIT DRL L180MM DIA4.3MM QUIK CPL W/O STP REUSE

## (undated) DEVICE — DRAIN SURG 15FR L3/16IN DIA4.7MM SIL CHN RND HUBLESS FULL

## (undated) DEVICE — ST BIAS STOCKINETTE: Brand: DEROYAL

## (undated) DEVICE — BANDAGE COMPR W4INXL5YD BGE COHESIVE SELF ADH ADBAN CBN1104] AVCOR HEALTHCARE PRODUCTS INC]

## (undated) DEVICE — HEX-LOCKING BLADE ELECTRODE: Brand: EDGE

## (undated) DEVICE — ADHESIVE TISS DERMA FLEX 0.7ML -- HIGH VISCOSITY

## (undated) DEVICE — 4.0MM ROUND CARBIDE BUR

## (undated) DEVICE — KENDALL SCD EXPRESS SLEEVES, KNEE LENGTH, MEDIUM: Brand: KENDALL SCD

## (undated) DEVICE — STAPLER SKIN H3.9MM WIRE DIA0.58MM CRWN 6.9MM 35 STPL FIX

## (undated) DEVICE — DISPOSABLE TOURNIQUET CUFF SINGLE BLADDER, SINGLE PORT AND QUICK CONNECT CONNECTOR: Brand: COLOR CUFF

## (undated) DEVICE — TAPE CST XF SET SPEC 4INX5YD --

## (undated) DEVICE — DRAPE,U/ SHT,SPLIT,PLAS,STERIL: Brand: MEDLINE

## (undated) DEVICE — SUTURE VCRL SZ 0 L18IN ABSRB VLT L36MM CT-1 1/2 CIR J740D

## (undated) DEVICE — CONVERTORS STOCKINETTE: Brand: CONVERTORS

## (undated) DEVICE — SNAP KOVER: Brand: UNBRANDED

## (undated) DEVICE — SOLUTION IV 1000ML 0.9% SOD CHL

## (undated) DEVICE — SPONGE GZ W4XL4IN COT 12 PLY TYP VII WVN C FLD DSGN

## (undated) DEVICE — BIT DRL L165MM DIA2.8MM QUIK CPL W/O STP REUSE

## (undated) DEVICE — SPONGE LAP 18X18IN STRL -- 5/PK

## (undated) DEVICE — SUTURE VCRL SZ 2-0 L18IN ABSRB UD CT-1 L36MM 1/2 CIR J839D

## (undated) DEVICE — DRAPE XR C ARM MOB SURG 44X72 --

## (undated) DEVICE — 2.5MM DRILL BIT/QC/GOLD/110MM

## (undated) DEVICE — SUTURE VCRL SZ 3-0 L27IN ABSRB UD L26MM SH 1/2 CIR J416H

## (undated) DEVICE — 3M™ STERI-DRAPE™ U-DRAPE 1015: Brand: STERI-DRAPE™

## (undated) DEVICE — PENROSE TUBING RADIOPAQUE: Brand: ARGYLE

## (undated) DEVICE — KIT PROC EXTRM HND FT CUST LF --

## (undated) DEVICE — BIPOLAR FORCEPS CORD,BANANA LEADS: Brand: VALLEYLAB

## (undated) DEVICE — EVACUATOR SURG 400CC PVC 3 SPR BLB FOR WND DRNGE

## (undated) DEVICE — BIT DRL L145MM DIA3.2MM QUIK CPL W/O STP REUSE